# Patient Record
Sex: FEMALE | Race: WHITE | NOT HISPANIC OR LATINO | Employment: OTHER
[De-identification: names, ages, dates, MRNs, and addresses within clinical notes are randomized per-mention and may not be internally consistent; named-entity substitution may affect disease eponyms.]

---

## 2019-01-23 ENCOUNTER — HOSPITAL (OUTPATIENT)
Dept: OTHER | Age: 59
End: 2019-01-23

## 2019-02-06 ENCOUNTER — HOSPITAL (OUTPATIENT)
Dept: OTHER | Age: 59
End: 2019-02-06

## 2020-01-06 ENCOUNTER — TELEPHONE (OUTPATIENT)
Dept: FAMILY MEDICINE CLINIC | Facility: CLINIC | Age: 60
End: 2020-01-06

## 2020-01-06 NOTE — TELEPHONE ENCOUNTER
Surgery on 01/27/20, LTHA with Dr. Rojelio Cloud @ [facility]    H&P- complete  Labs- MCV (101.5), RDW-SD (47.0), BUN/creat ratio (25.4), AST (12), HGA1C (6.0), PT (28.8), INR (2.10), all other labs WNL  EKG- abnormal  X-ray- abnormal    Dr Jasso Cons

## 2020-01-07 ENCOUNTER — APPOINTMENT (OUTPATIENT)
Dept: LAB | Facility: HOSPITAL | Age: 60
End: 2020-01-07
Attending: FAMILY MEDICINE
Payer: MEDICARE

## 2020-01-07 ENCOUNTER — OFFICE VISIT (OUTPATIENT)
Dept: FAMILY MEDICINE CLINIC | Facility: CLINIC | Age: 60
End: 2020-01-07
Payer: MEDICARE

## 2020-01-07 ENCOUNTER — OFFICE VISIT (OUTPATIENT)
Dept: OTHER | Facility: HOSPITAL | Age: 60
End: 2020-01-07
Attending: EMERGENCY MEDICINE

## 2020-01-07 ENCOUNTER — HOSPITAL ENCOUNTER (OUTPATIENT)
Dept: GENERAL RADIOLOGY | Facility: HOSPITAL | Age: 60
Discharge: HOME | End: 2020-01-07
Attending: FAMILY MEDICINE
Payer: MEDICARE

## 2020-01-07 VITALS
WEIGHT: 293 LBS | RESPIRATION RATE: 16 BRPM | HEIGHT: 70 IN | HEART RATE: 68 BPM | BODY MASS INDEX: 41.95 KG/M2 | OXYGEN SATURATION: 97 % | SYSTOLIC BLOOD PRESSURE: 110 MMHG | DIASTOLIC BLOOD PRESSURE: 60 MMHG | TEMPERATURE: 98 F

## 2020-01-07 DIAGNOSIS — M15.9 PRIMARY OSTEOARTHRITIS INVOLVING MULTIPLE JOINTS: ICD-10-CM

## 2020-01-07 DIAGNOSIS — Z01.812 PRE-OPERATIVE LABORATORY EXAMINATION: ICD-10-CM

## 2020-01-07 DIAGNOSIS — I47.1 PAROXYSMAL SVT (SUPRAVENTRICULAR TACHYCARDIA) (HCC): ICD-10-CM

## 2020-01-07 DIAGNOSIS — I80.9 BLOOD CLOT ASSOCIATED WITH VEIN WALL INFLAMMATION: ICD-10-CM

## 2020-01-07 DIAGNOSIS — J44.9 CHRONIC OBSTRUCTIVE PULMONARY DISEASE, UNSPECIFIED COPD TYPE (HCC): ICD-10-CM

## 2020-01-07 DIAGNOSIS — Z77.21 EXPOSURE TO BLOOD OR BODY FLUID: Primary | ICD-10-CM

## 2020-01-07 DIAGNOSIS — J30.9 ALLERGIC SINUSITIS: ICD-10-CM

## 2020-01-07 DIAGNOSIS — Z01.818 OTHER SPECIFIED PRE-OPERATIVE EXAMINATION: ICD-10-CM

## 2020-01-07 DIAGNOSIS — G47.33 OSA ON CPAP: ICD-10-CM

## 2020-01-07 DIAGNOSIS — Z92.29 HISTORY OF COUMADIN THERAPY: ICD-10-CM

## 2020-01-07 DIAGNOSIS — Z86.711 HISTORY OF PULMONARY EMBOLUS (PE): ICD-10-CM

## 2020-01-07 DIAGNOSIS — M47.816 LUMBAR SPONDYLOSIS: ICD-10-CM

## 2020-01-07 DIAGNOSIS — M35.3 PMR (POLYMYALGIA RHEUMATICA) (HCC): ICD-10-CM

## 2020-01-07 DIAGNOSIS — I10 ESSENTIAL HYPERTENSION: ICD-10-CM

## 2020-01-07 DIAGNOSIS — D89.2 HYPERGAMMAGLOBULINEMIA: ICD-10-CM

## 2020-01-07 DIAGNOSIS — R73.01 ELEVATED FASTING BLOOD SUGAR: ICD-10-CM

## 2020-01-07 DIAGNOSIS — I87.2 VENOUS INSUFFICIENCY OF BOTH LOWER EXTREMITIES: ICD-10-CM

## 2020-01-07 DIAGNOSIS — F51.01 PRIMARY INSOMNIA: ICD-10-CM

## 2020-01-07 DIAGNOSIS — E78.2 MIXED HYPERLIPIDEMIA: ICD-10-CM

## 2020-01-07 DIAGNOSIS — M16.12 PRIMARY OSTEOARTHRITIS OF LEFT HIP: Primary | ICD-10-CM

## 2020-01-07 DIAGNOSIS — Z99.89 OSA ON CPAP: ICD-10-CM

## 2020-01-07 LAB
ALBUMIN SERPL-MCNC: 3.8 G/DL (ref 3.4–5)
ALBUMIN/GLOB SERPL: 1.1 {RATIO} (ref 1–2)
ALP LIVER SERPL-CCNC: 73 U/L (ref 46–118)
ALT SERPL-CCNC: 16 U/L (ref 13–56)
ANION GAP SERPL CALC-SCNC: 3 MMOL/L (ref 0–18)
ANTIBODY SCREEN: NEGATIVE
APTT PPP: 35 SECONDS (ref 23.2–35.3)
AST SERPL-CCNC: 12 U/L (ref 15–37)
BASOPHILS # BLD AUTO: 0.04 X10(3) UL (ref 0–0.2)
BASOPHILS NFR BLD AUTO: 0.5 %
BILIRUB SERPL-MCNC: 0.3 MG/DL (ref 0.1–2)
BUN BLD-MCNC: 18 MG/DL (ref 7–18)
BUN/CREAT SERPL: 25.4 (ref 10–20)
CALCIUM BLD-MCNC: 9.4 MG/DL (ref 8.5–10.1)
CHLORIDE SERPL-SCNC: 106 MMOL/L (ref 98–112)
CO2 SERPL-SCNC: 32 MMOL/L (ref 21–32)
CREAT BLD-MCNC: 0.71 MG/DL (ref 0.55–1.02)
DEPRECATED RDW RBC AUTO: 47 FL (ref 35.1–46.3)
EOSINOPHIL # BLD AUTO: 0.2 X10(3) UL (ref 0–0.7)
EOSINOPHIL NFR BLD AUTO: 2.3 %
ERYTHROCYTE [DISTWIDTH] IN BLOOD BY AUTOMATED COUNT: 12.5 % (ref 11–15)
EST. AVERAGE GLUCOSE BLD GHB EST-MCNC: 126 MG/DL (ref 68–126)
GLOBULIN PLAS-MCNC: 3.5 G/DL (ref 2.8–4.4)
GLUCOSE BLD-MCNC: 91 MG/DL (ref 70–99)
HBA1C MFR BLD HPLC: 6 % (ref ?–5.7)
HBV SURFACE AG SER-ACNC: <0.1 [IU]/L
HBV SURFACE AG SERPL QL IA: NONREACTIVE
HCT VFR BLD AUTO: 40 % (ref 35–48)
HCV AB SERPL QL IA: NONREACTIVE
HGB BLD-MCNC: 12.8 G/DL (ref 12–16)
HIV1+2 AB SPEC QL IA.RAPID: NONREACTIVE
IMM GRANULOCYTES # BLD AUTO: 0.02 X10(3) UL (ref 0–1)
IMM GRANULOCYTES NFR BLD: 0.2 %
INR BLD: 2.1 (ref 0.9–1.2)
LYMPHOCYTES # BLD AUTO: 3.17 X10(3) UL (ref 1–4)
LYMPHOCYTES NFR BLD AUTO: 36.9 %
M PROTEIN MFR SERPL ELPH: 7.3 G/DL (ref 6.4–8.2)
MCH RBC QN AUTO: 32.5 PG (ref 26–34)
MCHC RBC AUTO-ENTMCNC: 32 G/DL (ref 31–37)
MCV RBC AUTO: 101.5 FL (ref 80–100)
MONOCYTES # BLD AUTO: 0.48 X10(3) UL (ref 0.1–1)
MONOCYTES NFR BLD AUTO: 5.6 %
NEUTROPHILS # BLD AUTO: 4.67 X10 (3) UL (ref 1.5–7.7)
NEUTROPHILS # BLD AUTO: 4.67 X10(3) UL (ref 1.5–7.7)
NEUTROPHILS NFR BLD AUTO: 54.5 %
OSMOLALITY SERPL CALC.SUM OF ELEC: 293 MOSM/KG (ref 275–295)
PATIENT FASTING Y/N/NP: YES
PLATELET # BLD AUTO: 354 10(3)UL (ref 150–450)
POTASSIUM SERPL-SCNC: 4.2 MMOL/L (ref 3.5–5.1)
PROTHROMBIN TIME: 23.8 SECONDS (ref 11.8–14.5)
RBC # BLD AUTO: 3.94 X10(6)UL (ref 3.8–5.3)
RH BLOOD TYPE: POSITIVE
SODIUM SERPL-SCNC: 141 MMOL/L (ref 136–145)
WBC # BLD AUTO: 8.6 X10(3) UL (ref 4–11)

## 2020-01-07 PROCEDURE — 87081 CULTURE SCREEN ONLY: CPT

## 2020-01-07 PROCEDURE — 36415 COLL VENOUS BLD VENIPUNCTURE: CPT

## 2020-01-07 PROCEDURE — 93010 ELECTROCARDIOGRAM REPORT: CPT | Performed by: FAMILY MEDICINE

## 2020-01-07 PROCEDURE — 80053 COMPREHEN METABOLIC PANEL: CPT

## 2020-01-07 PROCEDURE — 83036 HEMOGLOBIN GLYCOSYLATED A1C: CPT

## 2020-01-07 PROCEDURE — 71046 X-RAY EXAM CHEST 2 VIEWS: CPT | Performed by: FAMILY MEDICINE

## 2020-01-07 PROCEDURE — 86803 HEPATITIS C AB TEST: CPT

## 2020-01-07 PROCEDURE — 87340 HEPATITIS B SURFACE AG IA: CPT

## 2020-01-07 PROCEDURE — 85025 COMPLETE CBC W/AUTO DIFF WBC: CPT

## 2020-01-07 PROCEDURE — 86900 BLOOD TYPING SEROLOGIC ABO: CPT

## 2020-01-07 PROCEDURE — 86701 HIV-1ANTIBODY: CPT

## 2020-01-07 PROCEDURE — 85610 PROTHROMBIN TIME: CPT

## 2020-01-07 PROCEDURE — 99204 OFFICE O/P NEW MOD 45 MIN: CPT | Performed by: FAMILY MEDICINE

## 2020-01-07 PROCEDURE — 85730 THROMBOPLASTIN TIME PARTIAL: CPT

## 2020-01-07 PROCEDURE — 86850 RBC ANTIBODY SCREEN: CPT

## 2020-01-07 PROCEDURE — 86901 BLOOD TYPING SEROLOGIC RH(D): CPT

## 2020-01-07 PROCEDURE — 93005 ELECTROCARDIOGRAM TRACING: CPT

## 2020-01-07 RX ORDER — WARFARIN SODIUM 7.5 MG/1
7.5 TABLET ORAL NIGHTLY
COMMUNITY

## 2020-01-08 NOTE — H&P (VIEW-ONLY)
[facility] PRE-OP CLINIC [place]    PRE-OP NOTE    HPI:   I have been consulted by Dr. Elmira De La Rosa [facility] 61[facility] for a preoperative evaluation and medical clearance.  She has a long history of worsening severe degenerative arthritis improvement in cramps increase if needed up to 2 tid 90 tablet 3   • triamcinolone acetonide (KENALOG) 0.1 % Apply Externally Ointment APPLY TO THE AFFECTED AREA ON LOWER LEGS TWICE DAILY FOR ONE- TWO WEEKS THEN TAPER AS NEEDED (Patient not taking: Reporte Sexual activity: Not on file    Lifestyle      Physical activity:        Days per week: Not on file        Minutes per session: Not on file      Stress: Not on file    Relationships      Social connections:        Talks on phone: Not on file        Gets to index is 42.62 kg/m² as calculated from the following:    Height as of this encounter: 70\". Weight as of this encounter: 297 lb (134.7 kg). Vital signs reviewed. Appears stated age, well groomed.   Physical Exam:  GEN:  Patient is alert, awake and orie Date: 1/7/2020  ECG Report  Interpretation  -------------------------- Sinus Bradycardia -With rate variation cv = 11. Low voltage in precordial leads.  ABNORMAL No previous ECGs available Electronically signed on 01/07/2020 at 12:19 by Jay Hale (CPT=[id number]), URINE MICROSCOPIC W         REFLEX CULTURE, TYPE AND SCREEN    Patient Active Problem List:     Cramps, extremity     CXR, ECG and labs are pending review    Preoperative Risk Stratification: She can preform >4 METS without ischemic symptoms.

## 2020-01-08 NOTE — H&P
[facility] PRE-OP CLINIC [place]    PRE-OP NOTE    HPI:   I have been consulted by Dr. Ramon Terrell to see Marya Puff Holstein 61year old female for a preoperative evaluation and medical clearance.  She has a long history of worsening severe degenerative arthritis improvement in cramps increase if needed up to 2 tid 90 tablet 3   • triamcinolone acetonide (KENALOG) 0.1 % Apply Externally Ointment APPLY TO THE AFFECTED AREA ON LOWER LEGS TWICE DAILY FOR ONE- TWO WEEKS THEN TAPER AS NEEDED (Patient not taking: Reporte Sexual activity: Not on file    Lifestyle      Physical activity:        Days per week: Not on file        Minutes per session: Not on file      Stress: Not on file    Relationships      Social connections:        Talks on phone: Not on file        Gets to index is 42.62 kg/m² as calculated from the following:    Height as of this encounter: 70\". Weight as of this encounter: 297 lb (134.7 kg). Vital signs reviewed. Appears stated age, well groomed.   Physical Exam:  GEN:  Patient is alert, awake and orie Date: 1/7/2020  ECG Report  Interpretation  -------------------------- Sinus Bradycardia -With rate variation cv = 11. Low voltage in precordial leads.  ABNORMAL No previous ECGs available Electronically signed on 01/07/2020 at 12:19 by Zuri Love (CPT=[id number]), URINE MICROSCOPIC W         REFLEX CULTURE, TYPE AND SCREEN    Patient Active Problem List:     Cramps, extremity     CXR, ECG and labs are pending review    Preoperative Risk Stratification: She can preform >4 METS without ischemic symptoms.

## 2020-01-18 NOTE — TELEPHONE ENCOUNTER
Spoke to patient she will get UA done at [facility] 01/21/20. We will scan in results when available.      IVC filtered placed on 01/15    Dr Edin Smith Endocrinology - Clearance Letter scanned in Media 01/07/20    Pulmonary and Kingsley Carvalho

## 2020-01-21 PROBLEM — M16.12 OSTEOARTHRITIS OF LEFT HIP: Status: ACTIVE | Noted: 2020-01-21

## 2020-01-21 PROBLEM — M47.816 LUMBAR SPONDYLOSIS: Status: ACTIVE | Noted: 2020-01-21

## 2020-01-21 PROBLEM — E78.2 MIXED HYPERLIPIDEMIA: Chronic | Status: ACTIVE | Noted: 2020-01-21

## 2020-01-21 PROBLEM — Z86.711 HISTORY OF PULMONARY EMBOLISM: Chronic | Status: ACTIVE | Noted: 2020-01-21

## 2020-01-22 RX ORDER — DULOXETIN HYDROCHLORIDE 60 MG/1
60 CAPSULE, DELAYED RELEASE ORAL DAILY
COMMUNITY

## 2020-01-22 RX ORDER — TRAZODONE HYDROCHLORIDE 50 MG/1
100 TABLET ORAL NIGHTLY
COMMUNITY

## 2020-01-22 RX ORDER — ZOLPIDEM TARTRATE 10 MG/1
10 TABLET ORAL NIGHTLY PRN
COMMUNITY

## 2020-01-22 RX ORDER — CYCLOBENZAPRINE HCL 5 MG
5 TABLET ORAL 3 TIMES DAILY PRN
COMMUNITY

## 2020-01-22 RX ORDER — MONTELUKAST SODIUM 10 MG/1
10 TABLET ORAL EVERY MORNING
COMMUNITY

## 2020-01-22 RX ORDER — BIOTIN 1 MG
1000 TABLET ORAL DAILY
COMMUNITY

## 2020-01-22 RX ORDER — METOPROLOL TARTRATE 50 MG/1
75 TABLET, FILM COATED ORAL 2 TIMES DAILY
COMMUNITY

## 2020-01-22 RX ORDER — PRAVASTATIN SODIUM 80 MG/1
80 TABLET ORAL EVERY MORNING
COMMUNITY

## 2020-01-22 RX ORDER — OMEPRAZOLE 40 MG/1
40 CAPSULE, DELAYED RELEASE ORAL DAILY PRN
COMMUNITY

## 2020-01-23 NOTE — TELEPHONE ENCOUNTER
Dr Hima Josue please review.     H&P- complete  Labs- MCV (101.5), RDW-SD (47.0), BUN/creat ratio (25.4), AST (12), HGA1C (6.0), PT (28.8), INR (2.10), all other labs WNL  EKG- abnormal  X-ray- abnormal    IVC filtered placed on 01/15    Dr Pennie Solis Endocrinology -

## 2020-01-24 NOTE — CM/SW NOTE
SW received a call from the pt prior to surgery. Per pt, she would like to discuss ROHAN. Pt is scheduled for surgery with Dr. Shila Paulson on Monday 1/27/2020.  Pt states she would like to consider SNF and if needed she would like referrals to be sent to [facility]

## 2020-01-24 NOTE — CM/SW NOTE
SULEMA placed call to Dr. Carlos Lomeli nurse, Renuka Wright [phone] today 1/24 at 11am to alert of the pt's intent for snf placement after her surgery on Monday.  SULEMA relayed the pt's request for a 3-day waiver, however hip and knees are not a bundle that [address]

## 2020-01-27 ENCOUNTER — ANESTHESIA EVENT (OUTPATIENT)
Dept: SURGERY | Facility: HOSPITAL | Age: 60
DRG: 470 | End: 2020-01-27
Payer: MEDICARE

## 2020-01-27 ENCOUNTER — ANESTHESIA (OUTPATIENT)
Dept: SURGERY | Facility: HOSPITAL | Age: 60
DRG: 470 | End: 2020-01-27
Payer: MEDICARE

## 2020-01-27 ENCOUNTER — APPOINTMENT (OUTPATIENT)
Dept: GENERAL RADIOLOGY | Facility: HOSPITAL | Age: 60
DRG: 470 | End: 2020-01-27
Attending: PHYSICIAN ASSISTANT
Payer: MEDICARE

## 2020-01-27 ENCOUNTER — HOSPITAL ENCOUNTER (INPATIENT)
Facility: HOSPITAL | Age: 60
LOS: 3 days | Discharge: NURSING, REHAB OR LONG-TERM CARE | DRG: 470 | End: 2020-01-30
Attending: ORTHOPAEDIC SURGERY | Admitting: ORTHOPAEDIC SURGERY
Payer: MEDICARE

## 2020-01-27 PROBLEM — Z96.649 HISTORY OF HIP REPLACEMENT: Status: ACTIVE | Noted: 2020-01-27

## 2020-01-27 LAB
ALBUMIN SERPL-MCNC: 3.6 G/DL (ref 3.4–5)
ALBUMIN/GLOB SERPL: 1 {RATIO} (ref 1–2)
ALP LIVER SERPL-CCNC: 67 U/L (ref 46–118)
ALT SERPL-CCNC: 16 U/L (ref 13–56)
ANION GAP SERPL CALC-SCNC: 4 MMOL/L (ref 0–18)
AST SERPL-CCNC: 22 U/L (ref 15–37)
BILIRUB SERPL-MCNC: 0.4 MG/DL (ref 0.1–2)
BUN BLD-MCNC: 16 MG/DL (ref 7–18)
BUN/CREAT SERPL: 21.9 (ref 10–20)
CALCIUM BLD-MCNC: 9.2 MG/DL (ref 8.5–10.1)
CHLORIDE SERPL-SCNC: 106 MMOL/L (ref 98–112)
CO2 SERPL-SCNC: 29 MMOL/L (ref 21–32)
CREAT BLD-MCNC: 0.73 MG/DL (ref 0.55–1.02)
GLOBULIN PLAS-MCNC: 3.5 G/DL (ref 2.8–4.4)
GLUCOSE BLD-MCNC: 119 MG/DL (ref 70–99)
GLUCOSE BLDC GLUCOMTR-MCNC: 107 MG/DL (ref 70–99)
GLUCOSE BLDC GLUCOMTR-MCNC: 111 MG/DL (ref 70–99)
GLUCOSE BLDC GLUCOMTR-MCNC: 130 MG/DL (ref 70–99)
GLUCOSE BLDC GLUCOMTR-MCNC: 141 MG/DL (ref 70–99)
GLUCOSE BLDC GLUCOMTR-MCNC: 159 MG/DL (ref 70–99)
HCT VFR BLD AUTO: 38.1 % (ref 35–48)
HGB BLD-MCNC: 12.5 G/DL (ref 12–16)
INR BLD: 1 (ref 0.9–1.2)
M PROTEIN MFR SERPL ELPH: 7.1 G/DL (ref 6.4–8.2)
OSMOLALITY SERPL CALC.SUM OF ELEC: 290 MOSM/KG (ref 275–295)
POTASSIUM SERPL-SCNC: 4.5 MMOL/L (ref 3.5–5.1)
PROTHROMBIN TIME: 13 SECONDS (ref 11.8–14.5)
SODIUM SERPL-SCNC: 139 MMOL/L (ref 136–145)

## 2020-01-27 PROCEDURE — 82962 GLUCOSE BLOOD TEST: CPT

## 2020-01-27 PROCEDURE — 97162 PT EVAL MOD COMPLEX 30 MIN: CPT

## 2020-01-27 PROCEDURE — 36415 COLL VENOUS BLD VENIPUNCTURE: CPT | Performed by: ORTHOPAEDIC SURGERY

## 2020-01-27 PROCEDURE — 0SRB04A REPLACEMENT OF LEFT HIP JOINT WITH CERAMIC ON POLYETHYLENE SYNTHETIC SUBSTITUTE, UNCEMENTED, OPEN APPROACH: ICD-10-PCS | Performed by: ORTHOPAEDIC SURGERY

## 2020-01-27 PROCEDURE — 88305 TISSUE EXAM BY PATHOLOGIST: CPT | Performed by: ORTHOPAEDIC SURGERY

## 2020-01-27 PROCEDURE — 73502 X-RAY EXAM HIP UNI 2-3 VIEWS: CPT | Performed by: PHYSICIAN ASSISTANT

## 2020-01-27 PROCEDURE — 88311 DECALCIFY TISSUE: CPT | Performed by: ORTHOPAEDIC SURGERY

## 2020-01-27 PROCEDURE — 80053 COMPREHEN METABOLIC PANEL: CPT | Performed by: PHYSICIAN ASSISTANT

## 2020-01-27 PROCEDURE — 97530 THERAPEUTIC ACTIVITIES: CPT

## 2020-01-27 PROCEDURE — 85018 HEMOGLOBIN: CPT | Performed by: PHYSICIAN ASSISTANT

## 2020-01-27 PROCEDURE — 85610 PROTHROMBIN TIME: CPT | Performed by: ORTHOPAEDIC SURGERY

## 2020-01-27 PROCEDURE — 85014 HEMATOCRIT: CPT | Performed by: PHYSICIAN ASSISTANT

## 2020-01-27 DEVICE — SHELL ACET HEMI 54MM STRL HIP: Type: IMPLANTABLE DEVICE | Site: HIP | Status: FUNCTIONAL

## 2020-01-27 DEVICE — SCREW BN 6.5MM 25MM CANC TI: Type: IMPLANTABLE DEVICE | Site: HIP | Status: FUNCTIONAL

## 2020-01-27 DEVICE — LINER ACET MP8 NEUT 40MM NLTX: Type: IMPLANTABLE DEVICE | Site: HIP | Status: FUNCTIONAL

## 2020-01-27 DEVICE — STEM FEM LAT OFF STRL HIP P2: Type: IMPLANTABLE DEVICE | Site: HIP | Status: FUNCTIONAL

## 2020-01-27 DEVICE — HEAD FEM NEUT 40MM HIP BIO: Type: IMPLANTABLE DEVICE | Site: HIP | Status: FUNCTIONAL

## 2020-01-27 DEVICE — SCREW BN 6.5MM 20MM CANC TI: Type: IMPLANTABLE DEVICE | Site: HIP | Status: FUNCTIONAL

## 2020-01-27 DEVICE — THA CAP, PRIMARY: Type: IMPLANTABLE DEVICE | Status: FUNCTIONAL

## 2020-01-27 RX ORDER — TRANEXAMIC ACID 10 MG/ML
1000 INJECTION, SOLUTION INTRAVENOUS ONCE
Status: COMPLETED | OUTPATIENT
Start: 2020-01-27 | End: 2020-01-27

## 2020-01-27 RX ORDER — CEFADROXIL 500 MG/1
500 CAPSULE ORAL 2 TIMES DAILY
Qty: 14 CAPSULE | Refills: 0 | Status: SHIPPED | OUTPATIENT
Start: 2020-01-27 | End: 2020-02-03

## 2020-01-27 RX ORDER — ONDANSETRON 4 MG/1
4 TABLET, FILM COATED ORAL EVERY 8 HOURS PRN
Qty: 30 TABLET | Refills: 0 | Status: SHIPPED | OUTPATIENT
Start: 2020-01-27 | End: 2020-06-19

## 2020-01-27 RX ORDER — HYDROMORPHONE HYDROCHLORIDE 1 MG/ML
0.4 INJECTION, SOLUTION INTRAMUSCULAR; INTRAVENOUS; SUBCUTANEOUS EVERY 2 HOUR PRN
Status: DISCONTINUED | OUTPATIENT
Start: 2020-01-27 | End: 2020-01-30

## 2020-01-27 RX ORDER — BISACODYL 10 MG
10 SUPPOSITORY, RECTAL RECTAL
Status: DISCONTINUED | OUTPATIENT
Start: 2020-01-27 | End: 2020-01-30

## 2020-01-27 RX ORDER — CYCLOBENZAPRINE HCL 5 MG
5 TABLET ORAL EVERY 8 HOURS PRN
Status: DISCONTINUED | OUTPATIENT
Start: 2020-01-27 | End: 2020-01-30

## 2020-01-27 RX ORDER — OXYCODONE HCL 10 MG/1
10 TABLET, FILM COATED, EXTENDED RELEASE ORAL ONCE
Status: COMPLETED | OUTPATIENT
Start: 2020-01-27 | End: 2020-01-27

## 2020-01-27 RX ORDER — MORPHINE SULFATE 4 MG/ML
4 INJECTION, SOLUTION INTRAMUSCULAR; INTRAVENOUS EVERY 10 MIN PRN
Status: DISCONTINUED | OUTPATIENT
Start: 2020-01-27 | End: 2020-01-27 | Stop reason: HOSPADM

## 2020-01-27 RX ORDER — IBUPROFEN 600 MG/1
600 TABLET ORAL EVERY 8 HOURS PRN
Qty: 90 TABLET | Refills: 2 | Status: SHIPPED | OUTPATIENT
Start: 2020-01-27 | End: 2020-06-19

## 2020-01-27 RX ORDER — ACETAMINOPHEN 500 MG
1000 TABLET ORAL ONCE
Status: COMPLETED | OUTPATIENT
Start: 2020-01-27 | End: 2020-01-27

## 2020-01-27 RX ORDER — WARFARIN SODIUM 7.5 MG/1
7.5 TABLET ORAL NIGHTLY
Status: DISCONTINUED | OUTPATIENT
Start: 2020-01-27 | End: 2020-01-28

## 2020-01-27 RX ORDER — DEXAMETHASONE SODIUM PHOSPHATE 4 MG/ML
VIAL (ML) INJECTION AS NEEDED
Status: DISCONTINUED | OUTPATIENT
Start: 2020-01-27 | End: 2020-01-27 | Stop reason: SURG

## 2020-01-27 RX ORDER — TRAZODONE HYDROCHLORIDE 50 MG/1
50 TABLET ORAL NIGHTLY
Status: DISCONTINUED | OUTPATIENT
Start: 2020-01-27 | End: 2020-01-30

## 2020-01-27 RX ORDER — SODIUM PHOSPHATE, DIBASIC AND SODIUM PHOSPHATE, MONOBASIC 7; 19 G/133ML; G/133ML
1 ENEMA RECTAL ONCE AS NEEDED
Status: DISCONTINUED | OUTPATIENT
Start: 2020-01-27 | End: 2020-01-30

## 2020-01-27 RX ORDER — SODIUM CHLORIDE 0.9 % (FLUSH) 0.9 %
10 SYRINGE (ML) INJECTION AS NEEDED
Status: DISCONTINUED | OUTPATIENT
Start: 2020-01-27 | End: 2020-01-30

## 2020-01-27 RX ORDER — SODIUM CHLORIDE, SODIUM LACTATE, POTASSIUM CHLORIDE, CALCIUM CHLORIDE 600; 310; 30; 20 MG/100ML; MG/100ML; MG/100ML; MG/100ML
INJECTION, SOLUTION INTRAVENOUS CONTINUOUS
Status: DISCONTINUED | OUTPATIENT
Start: 2020-01-27 | End: 2020-01-30

## 2020-01-27 RX ORDER — DIPHENHYDRAMINE HYDROCHLORIDE 50 MG/ML
25 INJECTION INTRAMUSCULAR; INTRAVENOUS ONCE AS NEEDED
Status: ACTIVE | OUTPATIENT
Start: 2020-01-27 | End: 2020-01-27

## 2020-01-27 RX ORDER — PRAVASTATIN SODIUM 20 MG
80 TABLET ORAL DAILY
Status: DISCONTINUED | OUTPATIENT
Start: 2020-01-28 | End: 2020-01-30

## 2020-01-27 RX ORDER — DOCUSATE SODIUM 100 MG/1
100 CAPSULE, LIQUID FILLED ORAL 2 TIMES DAILY
Status: DISCONTINUED | OUTPATIENT
Start: 2020-01-27 | End: 2020-01-30

## 2020-01-27 RX ORDER — CELECOXIB 200 MG/1
200 CAPSULE ORAL ONCE
Status: COMPLETED | OUTPATIENT
Start: 2020-01-27 | End: 2020-01-27

## 2020-01-27 RX ORDER — OXYCODONE AND ACETAMINOPHEN 10; 325 MG/1; MG/1
1-2 TABLET ORAL EVERY 6 HOURS PRN
Status: DISCONTINUED | OUTPATIENT
Start: 2020-01-27 | End: 2020-01-30

## 2020-01-27 RX ORDER — ONDANSETRON 2 MG/ML
4 INJECTION INTRAMUSCULAR; INTRAVENOUS ONCE AS NEEDED
Status: DISCONTINUED | OUTPATIENT
Start: 2020-01-27 | End: 2020-01-27 | Stop reason: HOSPADM

## 2020-01-27 RX ORDER — FAMOTIDINE 20 MG/1
20 TABLET ORAL 2 TIMES DAILY
Status: DISCONTINUED | OUTPATIENT
Start: 2020-01-27 | End: 2020-01-30

## 2020-01-27 RX ORDER — DEXTROSE MONOHYDRATE 25 G/50ML
50 INJECTION, SOLUTION INTRAVENOUS
Status: DISCONTINUED | OUTPATIENT
Start: 2020-01-27 | End: 2020-01-27 | Stop reason: HOSPADM

## 2020-01-27 RX ORDER — HYDROMORPHONE HYDROCHLORIDE 1 MG/ML
0.8 INJECTION, SOLUTION INTRAMUSCULAR; INTRAVENOUS; SUBCUTANEOUS EVERY 2 HOUR PRN
Status: DISCONTINUED | OUTPATIENT
Start: 2020-01-27 | End: 2020-01-30

## 2020-01-27 RX ORDER — HYDROMORPHONE HYDROCHLORIDE 1 MG/ML
0.4 INJECTION, SOLUTION INTRAMUSCULAR; INTRAVENOUS; SUBCUTANEOUS EVERY 5 MIN PRN
Status: DISCONTINUED | OUTPATIENT
Start: 2020-01-27 | End: 2020-01-27 | Stop reason: HOSPADM

## 2020-01-27 RX ORDER — NALOXONE HYDROCHLORIDE 0.4 MG/ML
80 INJECTION, SOLUTION INTRAMUSCULAR; INTRAVENOUS; SUBCUTANEOUS AS NEEDED
Status: DISCONTINUED | OUTPATIENT
Start: 2020-01-27 | End: 2020-01-27 | Stop reason: HOSPADM

## 2020-01-27 RX ORDER — BUPIVACAINE HYDROCHLORIDE 7.5 MG/ML
INJECTION, SOLUTION INTRASPINAL AS NEEDED
Status: DISCONTINUED | OUTPATIENT
Start: 2020-01-27 | End: 2020-01-27 | Stop reason: SURG

## 2020-01-27 RX ORDER — GLYCOPYRROLATE 0.2 MG/ML
INJECTION INTRAMUSCULAR; INTRAVENOUS AS NEEDED
Status: DISCONTINUED | OUTPATIENT
Start: 2020-01-27 | End: 2020-01-27 | Stop reason: SURG

## 2020-01-27 RX ORDER — TRANEXAMIC ACID 10 MG/ML
INJECTION, SOLUTION INTRAVENOUS AS NEEDED
Status: DISCONTINUED | OUTPATIENT
Start: 2020-01-27 | End: 2020-01-27 | Stop reason: SURG

## 2020-01-27 RX ORDER — HYDROMORPHONE HYDROCHLORIDE 1 MG/ML
0.2 INJECTION, SOLUTION INTRAMUSCULAR; INTRAVENOUS; SUBCUTANEOUS EVERY 5 MIN PRN
Status: DISCONTINUED | OUTPATIENT
Start: 2020-01-27 | End: 2020-01-27 | Stop reason: HOSPADM

## 2020-01-27 RX ORDER — NEOSTIGMINE METHYLSULFATE 0.5 MG/ML
INJECTION INTRAVENOUS AS NEEDED
Status: DISCONTINUED | OUTPATIENT
Start: 2020-01-27 | End: 2020-01-27 | Stop reason: SURG

## 2020-01-27 RX ORDER — OXYCODONE HCL 10 MG/1
10 TABLET, FILM COATED, EXTENDED RELEASE ORAL EVERY 12 HOURS
Qty: 30 TABLET | Refills: 0 | Status: SHIPPED | OUTPATIENT
Start: 2020-01-27 | End: 2020-06-19

## 2020-01-27 RX ORDER — MONTELUKAST SODIUM 10 MG/1
10 TABLET ORAL EVERY MORNING
Status: DISCONTINUED | OUTPATIENT
Start: 2020-01-28 | End: 2020-01-30

## 2020-01-27 RX ORDER — WARFARIN SODIUM 5 MG/1
5 TABLET ORAL ONCE
Status: DISCONTINUED | OUTPATIENT
Start: 2020-01-27 | End: 2020-01-27

## 2020-01-27 RX ORDER — MIDAZOLAM HYDROCHLORIDE 1 MG/ML
INJECTION INTRAMUSCULAR; INTRAVENOUS AS NEEDED
Status: DISCONTINUED | OUTPATIENT
Start: 2020-01-27 | End: 2020-01-27 | Stop reason: SURG

## 2020-01-27 RX ORDER — PROCHLORPERAZINE EDISYLATE 5 MG/ML
10 INJECTION INTRAMUSCULAR; INTRAVENOUS EVERY 6 HOURS PRN
Status: ACTIVE | OUTPATIENT
Start: 2020-01-27 | End: 2020-01-29

## 2020-01-27 RX ORDER — DIPHENHYDRAMINE HCL 25 MG
25 CAPSULE ORAL EVERY 4 HOURS PRN
Status: DISCONTINUED | OUTPATIENT
Start: 2020-01-27 | End: 2020-01-30

## 2020-01-27 RX ORDER — METOCLOPRAMIDE HYDROCHLORIDE 5 MG/ML
10 INJECTION INTRAMUSCULAR; INTRAVENOUS EVERY 6 HOURS PRN
Status: ACTIVE | OUTPATIENT
Start: 2020-01-27 | End: 2020-01-29

## 2020-01-27 RX ORDER — HYDROMORPHONE HYDROCHLORIDE 1 MG/ML
0.6 INJECTION, SOLUTION INTRAMUSCULAR; INTRAVENOUS; SUBCUTANEOUS EVERY 5 MIN PRN
Status: DISCONTINUED | OUTPATIENT
Start: 2020-01-27 | End: 2020-01-27 | Stop reason: HOSPADM

## 2020-01-27 RX ORDER — MORPHINE SULFATE 4 MG/ML
2 INJECTION, SOLUTION INTRAMUSCULAR; INTRAVENOUS EVERY 10 MIN PRN
Status: DISCONTINUED | OUTPATIENT
Start: 2020-01-27 | End: 2020-01-27 | Stop reason: HOSPADM

## 2020-01-27 RX ORDER — DOCUSATE SODIUM 100 MG/1
100 CAPSULE, LIQUID FILLED ORAL 2 TIMES DAILY
Qty: 60 CAPSULE | Refills: 2 | Status: SHIPPED | OUTPATIENT
Start: 2020-01-27 | End: 2020-06-19

## 2020-01-27 RX ORDER — ONDANSETRON 2 MG/ML
INJECTION INTRAMUSCULAR; INTRAVENOUS AS NEEDED
Status: DISCONTINUED | OUTPATIENT
Start: 2020-01-27 | End: 2020-01-27 | Stop reason: SURG

## 2020-01-27 RX ORDER — FAMOTIDINE 10 MG/ML
20 INJECTION, SOLUTION INTRAVENOUS 2 TIMES DAILY
Status: DISCONTINUED | OUTPATIENT
Start: 2020-01-27 | End: 2020-01-30

## 2020-01-27 RX ORDER — DIPHENHYDRAMINE HCL 25 MG
75 CAPSULE ORAL 2 TIMES DAILY PRN
Status: DISCONTINUED | OUTPATIENT
Start: 2020-01-27 | End: 2020-01-30

## 2020-01-27 RX ORDER — SCOLOPAMINE TRANSDERMAL SYSTEM 1 MG/1
1 PATCH, EXTENDED RELEASE TRANSDERMAL ONCE
Status: COMPLETED | OUTPATIENT
Start: 2020-01-27 | End: 2020-01-30

## 2020-01-27 RX ORDER — HYDROCODONE BITARTRATE AND ACETAMINOPHEN 5; 325 MG/1; MG/1
1 TABLET ORAL AS NEEDED
Status: DISCONTINUED | OUTPATIENT
Start: 2020-01-27 | End: 2020-01-27 | Stop reason: HOSPADM

## 2020-01-27 RX ORDER — DIPHENHYDRAMINE HYDROCHLORIDE 50 MG/ML
12.5 INJECTION INTRAMUSCULAR; INTRAVENOUS EVERY 4 HOURS PRN
Status: DISCONTINUED | OUTPATIENT
Start: 2020-01-27 | End: 2020-01-30

## 2020-01-27 RX ORDER — MAGNESIUM OXIDE 400 MG (241.3 MG MAGNESIUM) TABLET
3 TABLET NIGHTLY
Status: DISCONTINUED | OUTPATIENT
Start: 2020-01-27 | End: 2020-01-30

## 2020-01-27 RX ORDER — ROCURONIUM BROMIDE 10 MG/ML
INJECTION, SOLUTION INTRAVENOUS AS NEEDED
Status: DISCONTINUED | OUTPATIENT
Start: 2020-01-27 | End: 2020-01-27 | Stop reason: SURG

## 2020-01-27 RX ORDER — MORPHINE SULFATE 10 MG/ML
6 INJECTION, SOLUTION INTRAMUSCULAR; INTRAVENOUS EVERY 10 MIN PRN
Status: DISCONTINUED | OUTPATIENT
Start: 2020-01-27 | End: 2020-01-27 | Stop reason: HOSPADM

## 2020-01-27 RX ORDER — LOSARTAN POTASSIUM 100 MG/1
100 TABLET ORAL DAILY
Status: DISCONTINUED | OUTPATIENT
Start: 2020-01-28 | End: 2020-01-30

## 2020-01-27 RX ORDER — SODIUM CHLORIDE, SODIUM LACTATE, POTASSIUM CHLORIDE, CALCIUM CHLORIDE 600; 310; 30; 20 MG/100ML; MG/100ML; MG/100ML; MG/100ML
INJECTION, SOLUTION INTRAVENOUS CONTINUOUS
Status: DISCONTINUED | OUTPATIENT
Start: 2020-01-27 | End: 2020-01-27 | Stop reason: HOSPADM

## 2020-01-27 RX ORDER — ONDANSETRON 2 MG/ML
4 INJECTION INTRAMUSCULAR; INTRAVENOUS EVERY 4 HOURS PRN
Status: ACTIVE | OUTPATIENT
Start: 2020-01-27 | End: 2020-01-29

## 2020-01-27 RX ORDER — PROCHLORPERAZINE EDISYLATE 5 MG/ML
5 INJECTION INTRAMUSCULAR; INTRAVENOUS ONCE AS NEEDED
Status: DISCONTINUED | OUTPATIENT
Start: 2020-01-27 | End: 2020-01-27 | Stop reason: HOSPADM

## 2020-01-27 RX ORDER — METOPROLOL TARTRATE 5 MG/5ML
2.5 INJECTION INTRAVENOUS ONCE
Status: DISCONTINUED | OUTPATIENT
Start: 2020-01-27 | End: 2020-01-27 | Stop reason: HOSPADM

## 2020-01-27 RX ORDER — HYDROMORPHONE HYDROCHLORIDE 1 MG/ML
0.2 INJECTION, SOLUTION INTRAMUSCULAR; INTRAVENOUS; SUBCUTANEOUS EVERY 2 HOUR PRN
Status: DISCONTINUED | OUTPATIENT
Start: 2020-01-27 | End: 2020-01-30

## 2020-01-27 RX ORDER — DULOXETIN HYDROCHLORIDE 60 MG/1
60 CAPSULE, DELAYED RELEASE ORAL DAILY
Status: DISCONTINUED | OUTPATIENT
Start: 2020-01-28 | End: 2020-01-30

## 2020-01-27 RX ORDER — FAMOTIDINE 20 MG/1
20 TABLET ORAL ONCE
Status: COMPLETED | OUTPATIENT
Start: 2020-01-27 | End: 2020-01-27

## 2020-01-27 RX ORDER — HYDROCODONE BITARTRATE AND ACETAMINOPHEN 5; 325 MG/1; MG/1
2 TABLET ORAL AS NEEDED
Status: DISCONTINUED | OUTPATIENT
Start: 2020-01-27 | End: 2020-01-27 | Stop reason: HOSPADM

## 2020-01-27 RX ORDER — ENOXAPARIN SODIUM 100 MG/ML
40 INJECTION SUBCUTANEOUS DAILY
Status: DISCONTINUED | OUTPATIENT
Start: 2020-01-28 | End: 2020-01-30

## 2020-01-27 RX ORDER — POLYETHYLENE GLYCOL 3350 17 G/17G
17 POWDER, FOR SOLUTION ORAL DAILY PRN
Status: DISCONTINUED | OUTPATIENT
Start: 2020-01-27 | End: 2020-01-30

## 2020-01-27 RX ORDER — LIDOCAINE HYDROCHLORIDE 10 MG/ML
INJECTION, SOLUTION EPIDURAL; INFILTRATION; INTRACAUDAL; PERINEURAL AS NEEDED
Status: DISCONTINUED | OUTPATIENT
Start: 2020-01-27 | End: 2020-01-27 | Stop reason: SURG

## 2020-01-27 RX ORDER — OXYCODONE AND ACETAMINOPHEN 10; 325 MG/1; MG/1
1-2 TABLET ORAL EVERY 6 HOURS PRN
Qty: 60 TABLET | Refills: 0 | Status: SHIPPED | OUTPATIENT
Start: 2020-01-27 | End: 2020-06-19

## 2020-01-27 RX ORDER — OXYCODONE HCL 10 MG/1
10 TABLET, FILM COATED, EXTENDED RELEASE ORAL EVERY 12 HOURS
Status: DISCONTINUED | OUTPATIENT
Start: 2020-01-27 | End: 2020-01-30

## 2020-01-27 RX ORDER — SODIUM CHLORIDE, SODIUM LACTATE, POTASSIUM CHLORIDE, CALCIUM CHLORIDE 600; 310; 30; 20 MG/100ML; MG/100ML; MG/100ML; MG/100ML
INJECTION, SOLUTION INTRAVENOUS CONTINUOUS
Status: DISCONTINUED | OUTPATIENT
Start: 2020-01-27 | End: 2020-01-27 | Stop reason: ALTCHOICE

## 2020-01-27 RX ORDER — ZOLPIDEM TARTRATE 5 MG/1
20 TABLET ORAL NIGHTLY PRN
Status: DISCONTINUED | OUTPATIENT
Start: 2020-01-27 | End: 2020-01-30

## 2020-01-27 RX ADMIN — LIDOCAINE HYDROCHLORIDE 25 MG: 10 INJECTION, SOLUTION EPIDURAL; INFILTRATION; INTRACAUDAL; PERINEURAL at 09:48:00

## 2020-01-27 RX ADMIN — ROCURONIUM BROMIDE 10 MG: 10 INJECTION, SOLUTION INTRAVENOUS at 09:55:00

## 2020-01-27 RX ADMIN — TRANEXAMIC ACID 1000 MG: 10 INJECTION, SOLUTION INTRAVENOUS at 10:26:00

## 2020-01-27 RX ADMIN — ROCURONIUM BROMIDE 20 MG: 10 INJECTION, SOLUTION INTRAVENOUS at 10:02:00

## 2020-01-27 RX ADMIN — LIDOCAINE HYDROCHLORIDE 50 MG: 10 INJECTION, SOLUTION EPIDURAL; INFILTRATION; INTRACAUDAL; PERINEURAL at 09:55:00

## 2020-01-27 RX ADMIN — MIDAZOLAM HYDROCHLORIDE 2 MG: 1 INJECTION INTRAMUSCULAR; INTRAVENOUS at 09:39:00

## 2020-01-27 RX ADMIN — NEOSTIGMINE METHYLSULFATE 4 MG: 0.5 INJECTION INTRAVENOUS at 11:38:00

## 2020-01-27 RX ADMIN — SODIUM CHLORIDE, SODIUM LACTATE, POTASSIUM CHLORIDE, CALCIUM CHLORIDE: 600; 310; 30; 20 INJECTION, SOLUTION INTRAVENOUS at 11:57:00

## 2020-01-27 RX ADMIN — ONDANSETRON 4 MG: 2 INJECTION INTRAMUSCULAR; INTRAVENOUS at 11:31:00

## 2020-01-27 RX ADMIN — DEXAMETHASONE SODIUM PHOSPHATE 4 MG: 4 MG/ML VIAL (ML) INJECTION at 09:57:00

## 2020-01-27 RX ADMIN — GLYCOPYRROLATE 0.6 MG: 0.2 INJECTION INTRAMUSCULAR; INTRAVENOUS at 11:38:00

## 2020-01-27 RX ADMIN — BUPIVACAINE HYDROCHLORIDE 1.5 ML: 7.5 INJECTION, SOLUTION INTRASPINAL at 09:55:00

## 2020-01-27 NOTE — ANESTHESIA PREPROCEDURE EVALUATION
Anesthesia PreOp Note    HPI:     Lily Conner Holstein is a 61year old female who presents for preoperative consultation requested by: Melina Celis MD    Date of Surgery: 1/27/2020    Procedure(s):  HIP TOTAL REPLACEMENT  Indication: left hip degenerative (supraventricular tachycardia) ([facility] [address]    • Visual impairment     glasses       Past Surgical History:   Procedure Laterality Date   • COLONOSCOPY     • EYE SURGERY     • HERNIA SURGERY      umbilical   • OTHER      IVC filter 1/15/2020   • TOTAL KNEE REPLAC 1/21/2020 at Unknown time  HYDROcodone-acetaminophen (NORCO)  MG Oral Tab, Take 2 tablets by mouth every 6 (six) hours as needed. , Disp: , Rfl: , 1/27/2020 at 530  losartan (COZAAR) 100 MG Oral Tab, Take 1 tablet by mouth daily. , Disp: , Rfl: , 1/26/ Social History    Socioeconomic History      Marital status:       Spouse name: Not on file      Number of children: Not on file      Years of education: Not on file      Highest education level: Not on file    Occupational History      Not on 01/07/2020    CA 9.4 01/07/2020     Lab Results   Component Value Date    INR 2.10 (H) 01/07/2020       Vital Signs: Body mass index is 39.42 kg/m². height is 1.778 m (5' 10\") and weight is 124.6 kg (274 lb 11.2 oz).  Her oral temperature is 98 °F (36.7

## 2020-01-27 NOTE — PLAN OF CARE
Problem: Patient Centered Care  Goal: Patient preferences are identified and integrated in the patient's plan of care  Description  Interventions:  - What would you like us to know as we care for you?   - Provide timely, complete, and accurate informatio distraction and/or relaxation techniques  - Monitor for opioid side effects  - Notify MD/LIP if interventions unsuccessful or patient reports new pain  - Anticipate increased pain with activity and pre-medicate as appropriate  Outcome: Progressing     Prob equipment as needed  - Ensure adequate protection for wounds/incisions during mobilization  - Obtain PT/OT consults as needed  - Advance activity as appropriate  - Communicate ordered activity level and limitations with patient/family  Outcome: Progressing alarm is in use,call light within reach. Bed is in lowest position. Hip posterior approach replacement pathway given and reviewed. Dr. Zheng Rand NP Gayle Samaniego notified about pt's admit to the unit. Home meds reconciled.   Placed on contact enteric isolation due t

## 2020-01-27 NOTE — INTERVAL H&P NOTE
Pre-op Diagnosis: left hip degenerative joint disease    The above referenced H&P was reviewed by Tatiana Esparza PA-C on 1/27/2020, the patient was examined and no significant changes have occurred in the patient's condition since the H&P was performed.   I

## 2020-01-27 NOTE — ANESTHESIA PROCEDURE NOTES
Spinal Block  Performed by: Rigo Christensen, CRNA  Authorized by: Esther Waterman MD       General Information and Staff    Start Time:  1/27/2020 9:48 AM  End Time:  1/27/2020 9:55 AM  Anesthesiologist:  Esther Waterman MD  CRNA:  Jasmina Villegas

## 2020-01-27 NOTE — CM/SW NOTE
SW received MDO for s/p total joint. SW met with the pt to confirm pt's information and home situation. Pt states she lives in a 1 story ranch. Pt lives there with her ex  who rents a room from her.  Pt uses a walker/ cane at home prior to surger

## 2020-01-27 NOTE — PHYSICAL THERAPY NOTE
PHYSICAL THERAPY HIP EVALUATION - INPATIENT     Room Number: 996/702-P  Evaluation Date: 1/27/2020  Type of Evaluation: Initial  Physician Order: PT Eval and Treat    Presenting Problem: L MICHELLE-posterior  Reason for Therapy: Mobility Dysfunction and Dischar maintain static standing with bilateral UE support on RW approx 5 minute prior to mobilization- initiation of lateral weight shift to prepare to walk.  Increased time for stand to sit position, patient became anxious that chair was too low- therapist buildi complication     hx of difficult intubation   • Anxiety state    • Arrhythmia    • Asthma     O2 use PRN on days, continuous at HS   • Back problem    • Constipation    • Deep vein blood clot of left lower extremity (HCC)     IVC filter placed 1/15/2020 Techniques: Activity promotion; Body mechanics;Repositioning;Breathing techniques    COGNITION  · Overall Cognitive Status:  WFL - within functional limits    RANGE OF MOTION AND STRENGTH ASSESSMENT  Upper extremity ROM and strength are within functional li Up in chair;Call light within reach; Needs met;RN aware of session/findings;Bracing education provided    CURRENT GOALS    Goals to be met by: 2/10/2020  Patient Goal Patient's self-stated goal is: return to PLOF   Goal #1 Patient is able to demonstrate sup

## 2020-01-27 NOTE — PROGRESS NOTES
[facility] HOSP - [facility]    Ortho Medical Progress Note     Chilton Danker Holstein Patient Status:  Inpatient    10/26/1960 MRN [id number]   Location [facility] 4W/SW/SE Attending Milton Jones MD   Hosp Day # 0 CHADD Blackman Date    WBC 8.6 01/07/2020    HGB 12.5 01/27/2020    HCT 38.1 01/27/2020    .0 01/07/2020    CREATSERUM 0.73 01/27/2020    BUN 16 01/27/2020     01/27/2020    K 4.5 01/27/2020     01/27/2020    CO2 29.0 01/27/2020     (H) 01/27/20

## 2020-01-27 NOTE — ANESTHESIA POSTPROCEDURE EVALUATION
Patient: Alphonse Mu Holstein    Procedure Summary     Date:  01/27/20 Room / Location:  [address] [place] [address] MAIN OR 04 / [facility] MAIN OR    Anesthesia Start:  0338 Anesthesia Stop:  4874    Procedure:  HIP TOTAL REPLACEMENT (Left ) Diagnosis:  (left hip degenerative joint disea

## 2020-01-27 NOTE — ANESTHESIA PROCEDURE NOTES
Airway  Date/Time: 1/27/2020 9:59 AM  Urgency: Elective      General Information and Staff    Patient location during procedure: OR  Anesthesiologist: Esther Watermna MD  Resident/CRNA: Rigo Christensen CRNA  Performed: CRNA and anesthesiologist

## 2020-01-28 LAB
ANION GAP SERPL CALC-SCNC: 3 MMOL/L (ref 0–18)
BUN BLD-MCNC: 13 MG/DL (ref 7–18)
BUN/CREAT SERPL: 18.8 (ref 10–20)
CALCIUM BLD-MCNC: 8.7 MG/DL (ref 8.5–10.1)
CHLORIDE SERPL-SCNC: 107 MMOL/L (ref 98–112)
CO2 SERPL-SCNC: 31 MMOL/L (ref 21–32)
CREAT BLD-MCNC: 0.69 MG/DL (ref 0.55–1.02)
DEPRECATED RDW RBC AUTO: 45.4 FL (ref 35.1–46.3)
ERYTHROCYTE [DISTWIDTH] IN BLOOD BY AUTOMATED COUNT: 12.5 % (ref 11–15)
GLUCOSE BLD-MCNC: 138 MG/DL (ref 70–99)
GLUCOSE BLDC GLUCOMTR-MCNC: 111 MG/DL (ref 70–99)
GLUCOSE BLDC GLUCOMTR-MCNC: 115 MG/DL (ref 70–99)
GLUCOSE BLDC GLUCOMTR-MCNC: 144 MG/DL (ref 70–99)
GLUCOSE BLDC GLUCOMTR-MCNC: 91 MG/DL (ref 70–99)
HCT VFR BLD AUTO: 33 % (ref 35–48)
HGB BLD-MCNC: 10.7 G/DL (ref 12–16)
INR BLD: 0.98 (ref 0.9–1.2)
MCH RBC QN AUTO: 31.9 PG (ref 26–34)
MCHC RBC AUTO-ENTMCNC: 32.4 G/DL (ref 31–37)
MCV RBC AUTO: 98.5 FL (ref 80–100)
OSMOLALITY SERPL CALC.SUM OF ELEC: 294 MOSM/KG (ref 275–295)
PLATELET # BLD AUTO: 324 10(3)UL (ref 150–450)
POTASSIUM SERPL-SCNC: 4.3 MMOL/L (ref 3.5–5.1)
PROTHROMBIN TIME: 12.8 SECONDS (ref 11.8–14.5)
RBC # BLD AUTO: 3.35 X10(6)UL (ref 3.8–5.3)
SODIUM SERPL-SCNC: 141 MMOL/L (ref 136–145)
WBC # BLD AUTO: 12.7 X10(3) UL (ref 4–11)

## 2020-01-28 PROCEDURE — 82962 GLUCOSE BLOOD TEST: CPT

## 2020-01-28 PROCEDURE — 85610 PROTHROMBIN TIME: CPT | Performed by: PHYSICIAN ASSISTANT

## 2020-01-28 PROCEDURE — 97535 SELF CARE MNGMENT TRAINING: CPT

## 2020-01-28 PROCEDURE — 80048 BASIC METABOLIC PNL TOTAL CA: CPT | Performed by: NURSE PRACTITIONER

## 2020-01-28 PROCEDURE — 97116 GAIT TRAINING THERAPY: CPT

## 2020-01-28 PROCEDURE — 85027 COMPLETE CBC AUTOMATED: CPT | Performed by: NURSE PRACTITIONER

## 2020-01-28 PROCEDURE — 97165 OT EVAL LOW COMPLEX 30 MIN: CPT

## 2020-01-28 PROCEDURE — 97530 THERAPEUTIC ACTIVITIES: CPT

## 2020-01-28 PROCEDURE — 97110 THERAPEUTIC EXERCISES: CPT

## 2020-01-28 RX ORDER — WARFARIN SODIUM 7.5 MG/1
7.5 TABLET ORAL NIGHTLY
Status: DISCONTINUED | OUTPATIENT
Start: 2020-01-29 | End: 2020-01-29

## 2020-01-28 RX ORDER — WARFARIN SODIUM 10 MG/1
10 TABLET ORAL NIGHTLY
Status: COMPLETED | OUTPATIENT
Start: 2020-01-28 | End: 2020-01-28

## 2020-01-28 NOTE — PHYSICAL THERAPY NOTE
PHYSICAL THERAPY HIP TREATMENT NOTE - INPATIENT    Room Number: 151/319-S            Presenting Problem: L MICHELLE-posterior    Problem List  Principal Problem:    Osteoarthritis of left hip  Active Problems:    Benign hypertension    Dyslipidemia    Morbid ob AM-PAC '6-Clicks' INPATIENT SHORT FORM - BASIC MOBILITY  How much difficulty does the patient currently have. ..  -   Turning over in bed (including adjusting bedclothes, sheets and blankets)?: A Little   -   Sitting down on and standing up from a kalpesh Patient verbalizes and/or demonstrates all precautions and safety concerns independently   Goal #5   Current Status In progress   Goal #6 Patient independently performs home exercise program for ROM/strengthening per the instructions provided in preparatio

## 2020-01-28 NOTE — CM/SW NOTE
Per PT/OT rec, pt needs ROHAN. SULEMA sent referral to [facility] for clinical review. SULEMA called [phone] ([facility]) to request a DON screen at ([id number])    PLAN: [facility] - Pending acceptance     Hanna Galan, DIONEW, MSW ext.  1

## 2020-01-28 NOTE — PLAN OF CARE
Patient alert and oriented. Room air. Denies numbness or tingling. Tele in place #53 - no calls. SCD's bilaterally, Coumadin and Lovenox for DVT prophylaxis. Voiding. Up with 2 assist to the rolling chair. Dressing C/D/I. Pain controlled.  Patient on enteri distraction and/or relaxation techniques  - Monitor for opioid side effects  - Notify MD/LIP if interventions unsuccessful or patient reports new pain  - Anticipate increased pain with activity and pre-medicate as appropriate  Outcome: Progressing     Prob equipment as needed  - Ensure adequate protection for wounds/incisions during mobilization  - Obtain PT/OT consults as needed  - Advance activity as appropriate  - Communicate ordered activity level and limitations with patient/family  Outcome: Progressing

## 2020-01-28 NOTE — PLAN OF CARE
Pain managed with PRN percocet Voiding freely. Up 1/walker. She would like to d/c to rehab, MD aware, PT to make recommendation.  This AM, patient found with open bottle of Norco. States she did not take any, only that she brought it for after discharge and anxiety  - Utilize distraction and/or relaxation techniques  - Monitor for opioid side effects  - Notify MD/LIP if interventions unsuccessful or patient reports new pain  - Anticipate increased pain with activity and pre-medicate as appropriate  Outcome: P and precautions (e.g. spinal or hip dislocation precautions)  Outcome: Progressing     Problem: Impaired Functional Mobility  Goal: Achieve highest/safest level of mobility/gait  Description  Interventions:  - Assess patient's functional ability and stabil

## 2020-01-28 NOTE — PROGRESS NOTES
[facility]    Ortho Medical Progress Note     [facility] Patient Status:  In[facility]    10/26/1960 MRN [id number]   Location [facility] 4W/SW/SE Attending Aaron Flores,  [facility] Day # 1 PCP Dayan Jordan Value Date    WBC 12.7 (H) 01/28/2020    HGB 10.7 (L) 01/28/2020    HCT 33.0 (L) 01/28/2020    .0 01/28/2020    CREATSERUM 0.69 01/28/2020    BUN 13 01/28/2020     01/28/2020    K 4.3 01/28/2020     01/28/2020    CO2 31.0 01/28/2020    G

## 2020-01-28 NOTE — OCCUPATIONAL THERAPY NOTE
OCCUPATIONAL THERAPY EVALUATION - INPATIENT      Room Number: 418/418-A  Evaluation Date: 1/28/2020  Type of Evaluation: Initial       Physician Order: IP Consult to Occupational Therapy  Reason for Therapy: ADL/IADL Dysfunction and Discharge Planning    O tasks with Min A for clothing management, grooming at sink with CGA. Patient returned back to bedside chair and set up with all needs in reach; stable upon exit.  Continue skilled occupational therapy while IP to maximize patient function and increase patie History  Past Surgical History:   Procedure Laterality Date   • COLONOSCOPY     • EYE SURGERY     • HERNIA SURGERY      umbilical   • HIP TOTAL REPLACEMENT Left 1/27/2020    Performed by Tami Boggs MD at [address] [place] [address] OR   • OTHER      IVC filter 1/15/2020 regular lower body clothing?: A Lot  -   Bathing (including washing, rinsing, drying)?: A Little  -   Toileting, which includes using toilet, bedpan or urinal? : A Little  -   Putting on and taking off regular upper body clothing?: A Little  -   Taking car

## 2020-01-28 NOTE — OPERATIVE REPORT
[address] [place] [address] [address]  OPERATIVE REPORT  PATIENT NAME: Ashlee Santoyo  MR#: [id number]  ATTENDING MD: Jayson Marquez  DATE OF PROCEDURE: 1/27/2020  PREOPERATIVE DIAGNOSIS: Degenerative Arthritis Left Hip acetabular and femoral component was able to be placed in stable position. PROCEDURE: The patient was admitted to the same day surgery program on Monday, January 27, 2020.  Following proper identification in the preoperative holding area with confirmation Hemispherical Shell acetabular component was placed in 45 degrees of verticality and 20 degrees of anteversion. This was seated and gave excellent initial stability. 2 Screws were used. Subsequently, a 54/40 polyethylene liner was inserted.   Attention was

## 2020-01-28 NOTE — PROGRESS NOTES
[facility]    Progress Note    Cloyce Payor Holstein Patient Status:  Inpatient    10/26/1960 MRN [id number]   Location [facility] 4W/SW/SE Attending Gordo Echeverria MD   Hosp Day # 1 PCP Mohini Brooks       Subjective:   Sophie Avila [phone]

## 2020-01-29 LAB
GLUCOSE BLDC GLUCOMTR-MCNC: 100 MG/DL (ref 70–99)
GLUCOSE BLDC GLUCOMTR-MCNC: 101 MG/DL (ref 70–99)
GLUCOSE BLDC GLUCOMTR-MCNC: 116 MG/DL (ref 70–99)
GLUCOSE BLDC GLUCOMTR-MCNC: 119 MG/DL (ref 70–99)
INR BLD: 1.11 (ref 0.9–1.2)
PROTHROMBIN TIME: 14.2 SECONDS (ref 11.8–14.5)

## 2020-01-29 PROCEDURE — 97116 GAIT TRAINING THERAPY: CPT

## 2020-01-29 PROCEDURE — 97110 THERAPEUTIC EXERCISES: CPT

## 2020-01-29 PROCEDURE — 82962 GLUCOSE BLOOD TEST: CPT

## 2020-01-29 PROCEDURE — 85610 PROTHROMBIN TIME: CPT | Performed by: PHYSICIAN ASSISTANT

## 2020-01-29 PROCEDURE — 97535 SELF CARE MNGMENT TRAINING: CPT

## 2020-01-29 RX ORDER — WARFARIN SODIUM 10 MG/1
10 TABLET ORAL NIGHTLY
Status: COMPLETED | OUTPATIENT
Start: 2020-01-29 | End: 2020-01-29

## 2020-01-29 RX ORDER — WARFARIN SODIUM 7.5 MG/1
7.5 TABLET ORAL NIGHTLY
Status: DISCONTINUED | OUTPATIENT
Start: 2020-01-30 | End: 2020-01-30

## 2020-01-29 NOTE — OCCUPATIONAL THERAPY NOTE
OCCUPATIONAL THERAPY TREATMENT NOTE - INPATIENT    Room Number: 418/418-A               Problem List  Principal Problem:    Osteoarthritis of left hip  Active Problems:    Benign hypertension    Dyslipidemia    Morbid obesity ([address]    Obstructive sleep apn Restriction: L lower extremity           L Lower Extremity: Weight Bearing as Tolerated    PAIN ASSESSMENT  Ratin  Location: LLE  Management Techniques:  Activity promotion;Repositioning           ACTIVITIES OF DAILY LIVING ASSESSMENT  AM-PAC ‘6-Clicks’

## 2020-01-29 NOTE — PLAN OF CARE
Patient up 1 x walker. Percocet tablets given for pain control. ACHS per order. CPAP @ HS. Tele #53, no calls received. Voiding freely. Plan for rehab tomorrow.    Problem: Patient Centered Care  Goal: Patient preferences are identified and integrated in th measures as appropriate and evaluate response  - Consider cultural and social influences on pain and pain management  - Manage/alleviate anxiety  - Utilize distraction and/or relaxation techniques  - Monitor for opioid side effects  - Notify MD/LIP if inte activity tolerance for standing, transferring and ambulating w/ or w/o assistive devices  - Assist with transfers and ambulation using safe patient handling equipment as needed  - Ensure adequate protection for wounds/incisions during mobilization  - Glenna Koenig

## 2020-01-29 NOTE — PHYSICAL THERAPY NOTE
PHYSICAL THERAPY HIP TREATMENT NOTE - INPATIENT    Room Number: 017/489-Y            Presenting Problem: L MICHELLE-posterior    Problem List  Principal Problem:    Osteoarthritis of left hip  Active Problems:    Benign hypertension    Dyslipidemia    Morbid ob INPATIENT SHORT FORM - BASIC MOBILITY  How much difficulty does the patient currently have. ..  -   Turning over in bed (including adjusting bedclothes, sheets and blankets)?: A Little   -   Sitting down on and standing up from a chair with arms (e.g., whee demonstrates all precautions and safety concerns independently   Goal #5   Current Status In progress   Goal #6 Patient independently performs home exercise program for ROM/strengthening per the instructions provided in preparation for discharge.    Goal #6

## 2020-01-29 NOTE — CM/SW NOTE
SW confirmed with RN that pt is medically ready for discharge today. SULEMA called and spoke with Claribel/ liaison from [place] to arrange a time for discharge.  [facility] can accept the pt at 1:30PM. RN is aware of discharge time and location

## 2020-01-29 NOTE — PLAN OF CARE
Pt is POD 2, Aox4, voiding freely, up with one assist and walker, tolerating PO pain medication, tele monitor in place, glucose checks ACHS, d/c plan to rehab facility of patient's choice after 3 midnights so sometime tomorrow    Problem: Patient Centered assistance  - Assess pain using appropriate pain scale  - Administer analgesics based on type and severity of pain and evaluate response  - Implement non-pharmacological measures as appropriate and evaluate response  - Consider cultural and social influenc indicated  Outcome: Progressing     Problem: MUSCULOSKELETAL - ADULT  Goal: Return mobility to safest level of function  Description  INTERVENTIONS:  - Assess patient stability and activity tolerance for standing, transferring and ambulating w/ or w/o assi

## 2020-01-29 NOTE — PROGRESS NOTES
[facility]    Ortho Medical Progress Note     Parker Danker Holstein Patient Status:  Inpatient    10/26/1960 MRN [id number]   Location [facility] 4W/SW/SE Attending Milton Jones, [facility] Day # 2 PCP Kai Blackman 01/28/2020    .0 01/28/2020    CREATSERUM 0.69 01/28/2020    BUN 13 01/28/2020     01/28/2020    K 4.3 01/28/2020     01/28/2020    CO2 31.0 01/28/2020     (H) 01/28/2020    CA 8.7 01/28/2020    ALB 3.6 01/27/2020    ALKPHO 67 01/

## 2020-01-30 VITALS
DIASTOLIC BLOOD PRESSURE: 84 MMHG | WEIGHT: 274.69 LBS | SYSTOLIC BLOOD PRESSURE: 121 MMHG | BODY MASS INDEX: 39.33 KG/M2 | HEIGHT: 70 IN | RESPIRATION RATE: 18 BRPM | HEART RATE: 65 BPM | OXYGEN SATURATION: 100 % | TEMPERATURE: 99 F

## 2020-01-30 LAB
GLUCOSE BLDC GLUCOMTR-MCNC: 104 MG/DL (ref 70–99)
GLUCOSE BLDC GLUCOMTR-MCNC: 99 MG/DL (ref 70–99)
INR BLD: 1.21 (ref 0.9–1.2)
PROTHROMBIN TIME: 15.2 SECONDS (ref 11.8–14.5)

## 2020-01-30 PROCEDURE — 97116 GAIT TRAINING THERAPY: CPT

## 2020-01-30 PROCEDURE — 97110 THERAPEUTIC EXERCISES: CPT

## 2020-01-30 PROCEDURE — 82962 GLUCOSE BLOOD TEST: CPT

## 2020-01-30 PROCEDURE — 85610 PROTHROMBIN TIME: CPT | Performed by: PHYSICIAN ASSISTANT

## 2020-01-30 NOTE — PROGRESS NOTES
[facility]    Ortho Medical Progress Note     Alphonse Mu Holstein Patient Status:  Inpatient    10/26/1960 MRN [id number]   Location [facility] 4W/SW/SE Attending Eunice Gregorio, [address] [facility] Day # 3 CHADD Hughes 0.69 01/28/2020    BUN 13 01/28/2020     01/28/2020    K 4.3 01/28/2020     01/28/2020    CO2 31.0 01/28/2020     (H) 01/28/2020    CA 8.7 01/28/2020    ALB 3.6 01/27/2020    ALKPHO 67 01/27/2020    BILT 0.4 01/27/2020    TP 7.1 01/27/20

## 2020-01-30 NOTE — PLAN OF CARE
Patient up 1 x walker. RA. Tele #53. Coumadin and lovonox for DVT prevention. Voiding freely. 2 percocet. ACHS per order. Transferring to rehab this afternoon.    Problem: Patient Centered Care  Goal: Patient preferences are identified and integrated in the measures as appropriate and evaluate response  - Consider cultural and social influences on pain and pain management  - Manage/alleviate anxiety  - Utilize distraction and/or relaxation techniques  - Monitor for opioid side effects  - Notify MD/LIP if inte activity tolerance for standing, transferring and ambulating w/ or w/o assistive devices  - Assist with transfers and ambulation using safe patient handling equipment as needed  - Ensure adequate protection for wounds/incisions during mobilization  - Carlos Fields

## 2020-01-30 NOTE — PLAN OF CARE
Comments: Comments: Pt is alert, calling appropriately for assistance. Sats well on R/A. Vitals monitored. Coum/Lov for DVT prophx. Voiding ok of urine. Accucheck AC/HS. OOB x1/SBA.  Aquacell over site reinforced overnight (rolling), will order new aquacell response  - Consider cultural and social influences on pain and pain management  - Manage/alleviate anxiety  - Utilize distraction and/or relaxation techniques  - Monitor for opioid side effects  - Notify MD/LIP if interventions unsuccessful or patient rep transferring and ambulating w/ or w/o assistive devices  - Assist with transfers and ambulation using safe patient handling equipment as needed  - Ensure adequate protection for wounds/incisions during mobilization  - Obtain PT/OT consults as needed  - Adv

## 2020-01-30 NOTE — PHYSICAL THERAPY NOTE
PHYSICAL THERAPY HIP TREATMENT NOTE - INPATIENT    Room Number: 291/110-N            Presenting Problem: L MICHELLE-posterior    Problem List  Principal Problem:    Osteoarthritis of left hip  Active Problems:    Benign hypertension    Dyslipidemia    Morbid ob Lying    O2 WALK                  AM-PAC '6-Clicks' INPATIENT SHORT FORM - BASIC MOBILITY  How much difficulty does the patient currently have. ..  -   Turning over in bed (including adjusting bedclothes, sheets and blankets)?: A Little   -   Sitting down o CG   Goal #4   Current Status    Goal #5 Patient verbalizes and/or demonstrates all precautions and safety concerns independently   Goal #5   Current Status In progress   Goal #6 Patient independently performs home exercise program for ROM/strengthening pe

## 2020-06-06 ENCOUNTER — TELEPHONE (OUTPATIENT)
Dept: FAMILY MEDICINE CLINIC | Facility: CLINIC | Age: 60
End: 2020-06-06

## 2020-06-06 NOTE — TELEPHONE ENCOUNTER
Patient having surgery on DOS 06/22/20 RTHA with [email or web address]      H&P-Complete  LABS-RDW-SD(50.0)Glucose(107)HA1C(6.3),BUN/Crea(22.0)AST(10)A/G(0.9)INR(2.54)PT(26.9)PTT(40.1)  EKG-01/07/20-Abnormal  XRAY-01/07/20-WNL    Pulmonary Clearance from 01/07/20

## 2020-06-09 ENCOUNTER — LAB ENCOUNTER (OUTPATIENT)
Dept: LAB | Facility: HOSPITAL | Age: 60
End: 2020-06-09
Attending: FAMILY MEDICINE
Payer: MEDICARE

## 2020-06-09 ENCOUNTER — OFFICE VISIT (OUTPATIENT)
Dept: FAMILY MEDICINE CLINIC | Facility: CLINIC | Age: 60
End: 2020-06-09
Payer: MEDICARE

## 2020-06-09 VITALS
SYSTOLIC BLOOD PRESSURE: 120 MMHG | BODY MASS INDEX: 39.08 KG/M2 | DIASTOLIC BLOOD PRESSURE: 80 MMHG | OXYGEN SATURATION: 98 % | HEART RATE: 58 BPM | RESPIRATION RATE: 16 BRPM | HEIGHT: 70 IN | WEIGHT: 273 LBS | TEMPERATURE: 99 F

## 2020-06-09 DIAGNOSIS — J45.20 MILD INTERMITTENT REACTIVE AIRWAY DISEASE WITHOUT COMPLICATION: ICD-10-CM

## 2020-06-09 DIAGNOSIS — G47.00 PERSISTENT INSOMNIA: ICD-10-CM

## 2020-06-09 DIAGNOSIS — Z01.812 PRE-OPERATIVE LABORATORY EXAMINATION: ICD-10-CM

## 2020-06-09 DIAGNOSIS — I87.2 VENOUS INSUFFICIENCY OF BOTH LOWER EXTREMITIES: ICD-10-CM

## 2020-06-09 DIAGNOSIS — E66.01 MORBID OBESITY (HCC): ICD-10-CM

## 2020-06-09 DIAGNOSIS — Z79.02 LONG TERM CURRENT USE OF ANTITHROMBOTICS/ANTIPLATELETS: ICD-10-CM

## 2020-06-09 DIAGNOSIS — E78.2 MIXED HYPERLIPIDEMIA: Chronic | ICD-10-CM

## 2020-06-09 DIAGNOSIS — Z86.711 HISTORY OF PULMONARY EMBOLUS (PE): ICD-10-CM

## 2020-06-09 DIAGNOSIS — R73.01 ELEVATED FASTING GLUCOSE: ICD-10-CM

## 2020-06-09 DIAGNOSIS — M16.11 PRIMARY OSTEOARTHRITIS OF RIGHT HIP: Primary | ICD-10-CM

## 2020-06-09 DIAGNOSIS — Z01.818 OTHER SPECIFIED PRE-OPERATIVE EXAMINATION: ICD-10-CM

## 2020-06-09 DIAGNOSIS — I47.1 SUPRAVENTRICULAR TACHYCARDIA (HCC): ICD-10-CM

## 2020-06-09 DIAGNOSIS — M35.3 PMR (POLYMYALGIA RHEUMATICA) (HCC): ICD-10-CM

## 2020-06-09 DIAGNOSIS — R25.2 CRAMPS, EXTREMITY: ICD-10-CM

## 2020-06-09 DIAGNOSIS — M47.816 LUMBAR SPONDYLOSIS: ICD-10-CM

## 2020-06-09 DIAGNOSIS — G47.33 OBSTRUCTIVE SLEEP APNEA SYNDROME: ICD-10-CM

## 2020-06-09 DIAGNOSIS — E78.5 DYSLIPIDEMIA: ICD-10-CM

## 2020-06-09 DIAGNOSIS — I10 BENIGN HYPERTENSION: ICD-10-CM

## 2020-06-09 DIAGNOSIS — Z96.642 HISTORY OF LEFT HIP REPLACEMENT: ICD-10-CM

## 2020-06-09 DIAGNOSIS — E11.9 TYPE 2 DIABETES MELLITUS WITHOUT COMPLICATION, WITHOUT LONG-TERM CURRENT USE OF INSULIN (HCC): ICD-10-CM

## 2020-06-09 PROCEDURE — 86901 BLOOD TYPING SEROLOGIC RH(D): CPT

## 2020-06-09 PROCEDURE — 86850 RBC ANTIBODY SCREEN: CPT

## 2020-06-09 PROCEDURE — 86900 BLOOD TYPING SEROLOGIC ABO: CPT

## 2020-06-09 PROCEDURE — 85610 PROTHROMBIN TIME: CPT

## 2020-06-09 PROCEDURE — 36415 COLL VENOUS BLD VENIPUNCTURE: CPT

## 2020-06-09 PROCEDURE — 85025 COMPLETE CBC W/AUTO DIFF WBC: CPT

## 2020-06-09 PROCEDURE — 87081 CULTURE SCREEN ONLY: CPT

## 2020-06-09 PROCEDURE — 83036 HEMOGLOBIN GLYCOSYLATED A1C: CPT

## 2020-06-09 PROCEDURE — 99214 OFFICE O/P EST MOD 30 MIN: CPT | Performed by: FAMILY MEDICINE

## 2020-06-09 PROCEDURE — 81015 MICROSCOPIC EXAM OF URINE: CPT

## 2020-06-09 PROCEDURE — 80053 COMPREHEN METABOLIC PANEL: CPT

## 2020-06-09 PROCEDURE — 85730 THROMBOPLASTIN TIME PARTIAL: CPT

## 2020-06-09 NOTE — H&P (VIEW-ONLY)
[facility] PRE-OP CLINIC [place]    PRE-OP NOTE    HPI:   I have been consulted by Dr. Elmira De La Rosa [facility] 61[facility] for a preoperative evaluation and medical clearance.  She has a long history of worsening severe degenerative arthritis Warfarin Sodium 7.5 MG Oral Tab Take 7.5 mg by mouth nightly. Coumadin 10mg Mon and Friday  Coumadin 7.5mg Tues,Wed and Thurs      • losartan (COZAAR) 100 MG Oral Tab Take 1 tablet by mouth daily.      • diphenhydrAMINE (BENADRYL ALLERGY) 25 MG Oral Tab [facility] • Difficult intubation    • Esophageal reflux    • Essential hypertension    • High blood pressure    • High cholesterol    • Hyperlipidemia    • Hypogammaglobulinemia (HCC)    • Osteoarthritis    • Palpitations    • Pneumonia     02/20   • Pulmonary emb Fear of current or ex partner: Not on file        Emotionally abused: Not on file        Physically abused: Not on file        Forced sexual activity: Not on file    Other Topics      Concerns:        Not on file    Social History Narrative      Not on lindy dentition. NECK: Supple, no CLAD, no JVD, no carotid bruit, no thyromegaly. SKIN: No rashes, no skin lesion, no bruising, good turgor. HEART:  Regular rate and rhythm, no murmurs, rubs or gallops.   LUNGS: Clear to auscultation bilterally, no rales/rhonc ([facility]    (G47.00) Persistent insomnia    (R25.2) Cramps, extremity    (M47.816) Lumbar spondylosis    (E78.5) Dyslipidemia    (E66.01) Morbid obesity ([facility]    (A47.548) History of left hip replacement    (M35.3) PMR (polymyalgia rheumatica) ([facility])    (J45.2 medications and supplements with anticoagulation properties as per instruction    Postoperative Recommendations:    Anticoagulation / DVT prophylaxis: SCDs and Warfarin    GI protection: Protonix  Incentive Spirometry   Telemetry as needed  CPAP/O2 as need

## 2020-06-09 NOTE — H&P
[facility] PRE-OP CLINIC [place]    PRE-OP NOTE    HPI:   I have been consulted by Dr. Venus Bernal to see Deyanne Server Holstein 61year old female for a preoperative evaluation and medical clearance.  She has a long history of worsening severe degenerative arthritis Warfarin Sodium 7.5 MG Oral Tab Take 7.5 mg by mouth nightly. Coumadin 10mg Mon and Friday  Coumadin 7.5mg Tues,Wed and Thurs      • losartan (COZAAR) 100 MG Oral Tab Take 1 tablet by mouth daily.      • diphenhydrAMINE (BENADRYL ALLERGY) 25 MG Oral Tab [facility] • Difficult intubation    • Esophageal reflux    • Essential hypertension    • High blood pressure    • High cholesterol    • Hyperlipidemia    • Hypogammaglobulinemia (HCC)    • Osteoarthritis    • Palpitations    • Pneumonia     02/20   • Pulmonary emb Fear of current or ex partner: Not on file        Emotionally abused: Not on file        Physically abused: Not on file        Forced sexual activity: Not on file    Other Topics      Concerns:        Not on file    Social History Narrative      Not on lindy dentition. NECK: Supple, no CLAD, no JVD, no carotid bruit, no thyromegaly. SKIN: No rashes, no skin lesion, no bruising, good turgor. HEART:  Regular rate and rhythm, no murmurs, rubs or gallops.   LUNGS: Clear to auscultation bilterally, no rales/rhonc ([facility] [address]    (G47.00) Persistent insomnia    (R25.2) Cramps, extremity    (M47.816) Lumbar spondylosis    (E78.5) Dyslipidemia    (E66.01) Morbid obesity ([facility] [address]    (A21.402) History of left hip replacement    (M35.3) PMR (polymyalgia rheumatica) ([facility])    (J45.2 medications and supplements with anticoagulation properties as per instruction    Postoperative Recommendations:    Anticoagulation / DVT prophylaxis: SCDs and Warfarin    GI protection: Protonix  Incentive Spirometry   Telemetry as needed  CPAP/O2 as need

## 2020-06-16 PROBLEM — M16.11 PRIMARY LOCALIZED OSTEOARTHRITIS OF RIGHT HIP: Chronic | Status: ACTIVE | Noted: 2020-06-16

## 2020-06-19 PROBLEM — M16.11 OSTEOARTHRITIS OF RIGHT HIP: Chronic | Status: ACTIVE | Noted: 2020-06-16

## 2020-06-19 RX ORDER — CEFDINIR 300 MG/1
300 CAPSULE ORAL 2 TIMES DAILY
COMMUNITY

## 2020-06-19 RX ORDER — METOCLOPRAMIDE 10 MG/1
10 TABLET ORAL ONCE
Status: DISCONTINUED | OUTPATIENT
Start: 2020-06-19 | End: 2020-06-19

## 2020-06-19 RX ORDER — HYDROCODONE BITARTRATE AND ACETAMINOPHEN 10; 325 MG/1; MG/1
1 TABLET ORAL EVERY 6 HOURS PRN
Status: ON HOLD | COMMUNITY
End: 2020-06-22

## 2020-06-19 NOTE — TELEPHONE ENCOUNTER
Spoke to patient, let her know we received Cardiac Clearance and latest EKG. Per David Gallegos NP patient is clear for surgery. Cardiac clearance and EKG sent to preop at [address] [place] [address] to be scanned in to chart.

## 2020-06-19 NOTE — DISCHARGE SUMMARY
Ortho Discharge Summary     Patient ID:  Rose Marie Naik Holstein  [id number]  61year old  10/26/1960      Admit Date: 6/22/2020  7:42 AM  Discharge Date and Time: 6/25/2020  5:37 PM    Attending Physician: Rock Najera MD     Reason for admission: right hip D

## 2020-06-22 ENCOUNTER — HOSPITAL ENCOUNTER (INPATIENT)
Facility: HOSPITAL | Age: 60
LOS: 3 days | Discharge: NURSING, REHAB OR LONG-TERM CARE | DRG: 470 | End: 2020-06-25
Attending: ORTHOPAEDIC SURGERY | Admitting: ORTHOPAEDIC SURGERY
Payer: MEDICARE

## 2020-06-22 ENCOUNTER — ANESTHESIA EVENT (OUTPATIENT)
Dept: SURGERY | Facility: HOSPITAL | Age: 60
DRG: 470 | End: 2020-06-22
Payer: MEDICARE

## 2020-06-22 ENCOUNTER — ANESTHESIA (OUTPATIENT)
Dept: SURGERY | Facility: HOSPITAL | Age: 60
DRG: 470 | End: 2020-06-22
Payer: MEDICARE

## 2020-06-22 ENCOUNTER — APPOINTMENT (OUTPATIENT)
Dept: GENERAL RADIOLOGY | Facility: HOSPITAL | Age: 60
DRG: 470 | End: 2020-06-22
Attending: PHYSICIAN ASSISTANT
Payer: MEDICARE

## 2020-06-22 PROBLEM — Z96.641 S/P HIP REPLACEMENT, RIGHT: Status: ACTIVE | Noted: 2020-06-22

## 2020-06-22 PROCEDURE — 97162 PT EVAL MOD COMPLEX 30 MIN: CPT

## 2020-06-22 PROCEDURE — 94002 VENT MGMT INPAT INIT DAY: CPT

## 2020-06-22 PROCEDURE — 36415 COLL VENOUS BLD VENIPUNCTURE: CPT | Performed by: ORTHOPAEDIC SURGERY

## 2020-06-22 PROCEDURE — 82962 GLUCOSE BLOOD TEST: CPT

## 2020-06-22 PROCEDURE — 85610 PROTHROMBIN TIME: CPT | Performed by: ORTHOPAEDIC SURGERY

## 2020-06-22 PROCEDURE — 88311 DECALCIFY TISSUE: CPT | Performed by: ORTHOPAEDIC SURGERY

## 2020-06-22 PROCEDURE — 88305 TISSUE EXAM BY PATHOLOGIST: CPT | Performed by: ORTHOPAEDIC SURGERY

## 2020-06-22 PROCEDURE — 0SR90JA REPLACEMENT OF RIGHT HIP JOINT WITH SYNTHETIC SUBSTITUTE, UNCEMENTED, OPEN APPROACH: ICD-10-PCS | Performed by: ORTHOPAEDIC SURGERY

## 2020-06-22 PROCEDURE — 73502 X-RAY EXAM HIP UNI 2-3 VIEWS: CPT | Performed by: PHYSICIAN ASSISTANT

## 2020-06-22 PROCEDURE — 97530 THERAPEUTIC ACTIVITIES: CPT

## 2020-06-22 DEVICE — IMPLANTABLE DEVICE: Type: IMPLANTABLE DEVICE | Site: HIP | Status: FUNCTIONAL

## 2020-06-22 DEVICE — HEAD FEM NEUT 40MM HIP BIO: Type: IMPLANTABLE DEVICE | Site: HIP | Status: FUNCTIONAL

## 2020-06-22 DEVICE — SCREW BN 6.5MM 15MM CANC TI: Type: IMPLANTABLE DEVICE | Site: HIP | Status: FUNCTIONAL

## 2020-06-22 DEVICE — STEM FEM LAT OFF STRL HIP P2: Type: IMPLANTABLE DEVICE | Site: HIP | Status: FUNCTIONAL

## 2020-06-22 DEVICE — SHELL ACET HEMI 54MM STRL HIP: Type: IMPLANTABLE DEVICE | Site: HIP | Status: FUNCTIONAL

## 2020-06-22 DEVICE — LINER ACET MP8 NEUT 40MM NLTX: Type: IMPLANTABLE DEVICE | Site: HIP | Status: FUNCTIONAL

## 2020-06-22 RX ORDER — MORPHINE SULFATE 4 MG/ML
2 INJECTION, SOLUTION INTRAMUSCULAR; INTRAVENOUS EVERY 10 MIN PRN
Status: DISCONTINUED | OUTPATIENT
Start: 2020-06-22 | End: 2020-06-22 | Stop reason: HOSPADM

## 2020-06-22 RX ORDER — HYDROMORPHONE HYDROCHLORIDE 1 MG/ML
0.4 INJECTION, SOLUTION INTRAMUSCULAR; INTRAVENOUS; SUBCUTANEOUS EVERY 2 HOUR PRN
Status: DISCONTINUED | OUTPATIENT
Start: 2020-06-22 | End: 2020-06-25

## 2020-06-22 RX ORDER — ROCURONIUM BROMIDE 10 MG/ML
INJECTION, SOLUTION INTRAVENOUS AS NEEDED
Status: DISCONTINUED | OUTPATIENT
Start: 2020-06-22 | End: 2020-06-22 | Stop reason: SURG

## 2020-06-22 RX ORDER — METOCLOPRAMIDE HYDROCHLORIDE 5 MG/ML
10 INJECTION INTRAMUSCULAR; INTRAVENOUS EVERY 6 HOURS PRN
Status: ACTIVE | OUTPATIENT
Start: 2020-06-22 | End: 2020-06-24

## 2020-06-22 RX ORDER — HYDROMORPHONE HYDROCHLORIDE 1 MG/ML
0.4 INJECTION, SOLUTION INTRAMUSCULAR; INTRAVENOUS; SUBCUTANEOUS EVERY 5 MIN PRN
Status: DISCONTINUED | OUTPATIENT
Start: 2020-06-22 | End: 2020-06-22 | Stop reason: HOSPADM

## 2020-06-22 RX ORDER — METOPROLOL TARTRATE 50 MG/1
50 TABLET, FILM COATED ORAL
Status: DISCONTINUED | OUTPATIENT
Start: 2020-06-22 | End: 2020-06-25

## 2020-06-22 RX ORDER — HYDROMORPHONE HYDROCHLORIDE 1 MG/ML
0.2 INJECTION, SOLUTION INTRAMUSCULAR; INTRAVENOUS; SUBCUTANEOUS EVERY 5 MIN PRN
Status: DISCONTINUED | OUTPATIENT
Start: 2020-06-22 | End: 2020-06-22 | Stop reason: HOSPADM

## 2020-06-22 RX ORDER — WARFARIN SODIUM 5 MG/1
10 TABLET ORAL ONCE
Status: COMPLETED | OUTPATIENT
Start: 2020-06-22 | End: 2020-06-22

## 2020-06-22 RX ORDER — ONDANSETRON 2 MG/ML
4 INJECTION INTRAMUSCULAR; INTRAVENOUS ONCE AS NEEDED
Status: DISCONTINUED | OUTPATIENT
Start: 2020-06-22 | End: 2020-06-22 | Stop reason: HOSPADM

## 2020-06-22 RX ORDER — POLYETHYLENE GLYCOL 3350 17 G/17G
17 POWDER, FOR SOLUTION ORAL DAILY PRN
Status: DISCONTINUED | OUTPATIENT
Start: 2020-06-22 | End: 2020-06-24

## 2020-06-22 RX ORDER — BIOTIN 1 MG
1000 TABLET ORAL DAILY
Status: DISCONTINUED | OUTPATIENT
Start: 2020-06-22 | End: 2020-06-22

## 2020-06-22 RX ORDER — SODIUM CHLORIDE, SODIUM LACTATE, POTASSIUM CHLORIDE, CALCIUM CHLORIDE 600; 310; 30; 20 MG/100ML; MG/100ML; MG/100ML; MG/100ML
INJECTION, SOLUTION INTRAVENOUS CONTINUOUS
Status: DISCONTINUED | OUTPATIENT
Start: 2020-06-22 | End: 2020-06-22 | Stop reason: HOSPADM

## 2020-06-22 RX ORDER — ONDANSETRON 2 MG/ML
4 INJECTION INTRAMUSCULAR; INTRAVENOUS EVERY 4 HOURS PRN
Status: ACTIVE | OUTPATIENT
Start: 2020-06-22 | End: 2020-06-24

## 2020-06-22 RX ORDER — MORPHINE SULFATE 4 MG/ML
4 INJECTION, SOLUTION INTRAMUSCULAR; INTRAVENOUS EVERY 10 MIN PRN
Status: DISCONTINUED | OUTPATIENT
Start: 2020-06-22 | End: 2020-06-22 | Stop reason: HOSPADM

## 2020-06-22 RX ORDER — DEXTROSE MONOHYDRATE 25 G/50ML
50 INJECTION, SOLUTION INTRAVENOUS
Status: DISCONTINUED | OUTPATIENT
Start: 2020-06-22 | End: 2020-06-25

## 2020-06-22 RX ORDER — ACETAMINOPHEN 500 MG
1000 TABLET ORAL ONCE
Status: COMPLETED | OUTPATIENT
Start: 2020-06-22 | End: 2020-06-22

## 2020-06-22 RX ORDER — TRANEXAMIC ACID 10 MG/ML
1000 INJECTION, SOLUTION INTRAVENOUS ONCE
Status: COMPLETED | OUTPATIENT
Start: 2020-06-22 | End: 2020-06-22

## 2020-06-22 RX ORDER — MONTELUKAST SODIUM 10 MG/1
10 TABLET ORAL EVERY EVENING
Status: DISCONTINUED | OUTPATIENT
Start: 2020-06-22 | End: 2020-06-25

## 2020-06-22 RX ORDER — PROCHLORPERAZINE EDISYLATE 5 MG/ML
10 INJECTION INTRAMUSCULAR; INTRAVENOUS EVERY 6 HOURS PRN
Status: ACTIVE | OUTPATIENT
Start: 2020-06-22 | End: 2020-06-24

## 2020-06-22 RX ORDER — PROCHLORPERAZINE EDISYLATE 5 MG/ML
5 INJECTION INTRAMUSCULAR; INTRAVENOUS ONCE AS NEEDED
Status: DISCONTINUED | OUTPATIENT
Start: 2020-06-22 | End: 2020-06-22 | Stop reason: HOSPADM

## 2020-06-22 RX ORDER — DOCUSATE SODIUM 100 MG/1
100 CAPSULE, LIQUID FILLED ORAL 2 TIMES DAILY
Status: DISCONTINUED | OUTPATIENT
Start: 2020-06-22 | End: 2020-06-25

## 2020-06-22 RX ORDER — DEXAMETHASONE SODIUM PHOSPHATE 4 MG/ML
VIAL (ML) INJECTION AS NEEDED
Status: DISCONTINUED | OUTPATIENT
Start: 2020-06-22 | End: 2020-06-22 | Stop reason: SURG

## 2020-06-22 RX ORDER — FAMOTIDINE 20 MG/1
20 TABLET ORAL 2 TIMES DAILY
Status: DISCONTINUED | OUTPATIENT
Start: 2020-06-22 | End: 2020-06-25

## 2020-06-22 RX ORDER — WARFARIN SODIUM 5 MG/1
5 TABLET ORAL ONCE
Status: DISCONTINUED | OUTPATIENT
Start: 2020-06-22 | End: 2020-06-22

## 2020-06-22 RX ORDER — ACETAMINOPHEN 500 MG
1000 TABLET ORAL ONCE
Status: DISCONTINUED | OUTPATIENT
Start: 2020-06-22 | End: 2020-06-22 | Stop reason: HOSPADM

## 2020-06-22 RX ORDER — HYDROMORPHONE HYDROCHLORIDE 1 MG/ML
0.8 INJECTION, SOLUTION INTRAMUSCULAR; INTRAVENOUS; SUBCUTANEOUS EVERY 2 HOUR PRN
Status: DISCONTINUED | OUTPATIENT
Start: 2020-06-22 | End: 2020-06-25

## 2020-06-22 RX ORDER — MORPHINE SULFATE 10 MG/ML
6 INJECTION, SOLUTION INTRAMUSCULAR; INTRAVENOUS EVERY 10 MIN PRN
Status: DISCONTINUED | OUTPATIENT
Start: 2020-06-22 | End: 2020-06-22 | Stop reason: HOSPADM

## 2020-06-22 RX ORDER — OXYCODONE AND ACETAMINOPHEN 10; 325 MG/1; MG/1
1-2 TABLET ORAL EVERY 6 HOURS PRN
Qty: 60 TABLET | Refills: 0 | Status: SHIPPED | OUTPATIENT
Start: 2020-06-22

## 2020-06-22 RX ORDER — MAGNESIUM HYDROXIDE 1200 MG/15ML
LIQUID ORAL CONTINUOUS PRN
Status: COMPLETED | OUTPATIENT
Start: 2020-06-22 | End: 2020-06-22

## 2020-06-22 RX ORDER — BISACODYL 10 MG
10 SUPPOSITORY, RECTAL RECTAL
Status: DISCONTINUED | OUTPATIENT
Start: 2020-06-22 | End: 2020-06-24

## 2020-06-22 RX ORDER — SODIUM CHLORIDE, SODIUM LACTATE, POTASSIUM CHLORIDE, CALCIUM CHLORIDE 600; 310; 30; 20 MG/100ML; MG/100ML; MG/100ML; MG/100ML
INJECTION, SOLUTION INTRAVENOUS CONTINUOUS
Status: DISCONTINUED | OUTPATIENT
Start: 2020-06-22 | End: 2020-06-25

## 2020-06-22 RX ORDER — DIPHENHYDRAMINE HCL 25 MG
25 CAPSULE ORAL EVERY 4 HOURS PRN
Status: DISCONTINUED | OUTPATIENT
Start: 2020-06-22 | End: 2020-06-25

## 2020-06-22 RX ORDER — HYDROCODONE BITARTRATE AND ACETAMINOPHEN 5; 325 MG/1; MG/1
1 TABLET ORAL AS NEEDED
Status: DISCONTINUED | OUTPATIENT
Start: 2020-06-22 | End: 2020-06-22 | Stop reason: HOSPADM

## 2020-06-22 RX ORDER — HALOPERIDOL 5 MG/ML
0.25 INJECTION INTRAMUSCULAR ONCE AS NEEDED
Status: DISCONTINUED | OUTPATIENT
Start: 2020-06-22 | End: 2020-06-22 | Stop reason: HOSPADM

## 2020-06-22 RX ORDER — OXYCODONE HCL 10 MG/1
10 TABLET, FILM COATED, EXTENDED RELEASE ORAL EVERY 12 HOURS
Qty: 20 TABLET | Refills: 0 | Status: SHIPPED | OUTPATIENT
Start: 2020-06-22

## 2020-06-22 RX ORDER — METOPROLOL TARTRATE 5 MG/5ML
2.5 INJECTION INTRAVENOUS ONCE
Status: DISCONTINUED | OUTPATIENT
Start: 2020-06-22 | End: 2020-06-22 | Stop reason: HOSPADM

## 2020-06-22 RX ORDER — FAMOTIDINE 10 MG/ML
20 INJECTION, SOLUTION INTRAVENOUS 2 TIMES DAILY
Status: DISCONTINUED | OUTPATIENT
Start: 2020-06-22 | End: 2020-06-25

## 2020-06-22 RX ORDER — NALOXONE HYDROCHLORIDE 0.4 MG/ML
80 INJECTION, SOLUTION INTRAMUSCULAR; INTRAVENOUS; SUBCUTANEOUS AS NEEDED
Status: DISCONTINUED | OUTPATIENT
Start: 2020-06-22 | End: 2020-06-22 | Stop reason: HOSPADM

## 2020-06-22 RX ORDER — SODIUM PHOSPHATE, DIBASIC AND SODIUM PHOSPHATE, MONOBASIC 7; 19 G/133ML; G/133ML
1 ENEMA RECTAL ONCE AS NEEDED
Status: DISCONTINUED | OUTPATIENT
Start: 2020-06-22 | End: 2020-06-25

## 2020-06-22 RX ORDER — FAMOTIDINE 20 MG/1
20 TABLET ORAL ONCE
Status: COMPLETED | OUTPATIENT
Start: 2020-06-22 | End: 2020-06-22

## 2020-06-22 RX ORDER — HYDROMORPHONE HYDROCHLORIDE 1 MG/ML
0.2 INJECTION, SOLUTION INTRAMUSCULAR; INTRAVENOUS; SUBCUTANEOUS EVERY 2 HOUR PRN
Status: DISCONTINUED | OUTPATIENT
Start: 2020-06-22 | End: 2020-06-25

## 2020-06-22 RX ORDER — OXYCODONE HYDROCHLORIDE 5 MG/1
10 TABLET ORAL ONCE
Status: COMPLETED | OUTPATIENT
Start: 2020-06-22 | End: 2020-06-22

## 2020-06-22 RX ORDER — LIDOCAINE HYDROCHLORIDE 10 MG/ML
INJECTION, SOLUTION EPIDURAL; INFILTRATION; INTRACAUDAL; PERINEURAL AS NEEDED
Status: DISCONTINUED | OUTPATIENT
Start: 2020-06-22 | End: 2020-06-22 | Stop reason: SURG

## 2020-06-22 RX ORDER — HYDROCODONE BITARTRATE AND ACETAMINOPHEN 5; 325 MG/1; MG/1
2 TABLET ORAL AS NEEDED
Status: DISCONTINUED | OUTPATIENT
Start: 2020-06-22 | End: 2020-06-22 | Stop reason: HOSPADM

## 2020-06-22 RX ORDER — TRANEXAMIC ACID 10 MG/ML
INJECTION, SOLUTION INTRAVENOUS AS NEEDED
Status: DISCONTINUED | OUTPATIENT
Start: 2020-06-22 | End: 2020-06-22 | Stop reason: SURG

## 2020-06-22 RX ORDER — ONDANSETRON 4 MG/1
4 TABLET, FILM COATED ORAL EVERY 8 HOURS PRN
Qty: 30 TABLET | Refills: 0 | Status: SHIPPED | OUTPATIENT
Start: 2020-06-22

## 2020-06-22 RX ORDER — CYCLOBENZAPRINE HCL 5 MG
5 TABLET ORAL EVERY 8 HOURS PRN
Status: DISCONTINUED | OUTPATIENT
Start: 2020-06-22 | End: 2020-06-25

## 2020-06-22 RX ORDER — CELECOXIB 200 MG/1
200 CAPSULE ORAL ONCE
Status: COMPLETED | OUTPATIENT
Start: 2020-06-22 | End: 2020-06-22

## 2020-06-22 RX ORDER — SCOLOPAMINE TRANSDERMAL SYSTEM 1 MG/1
1 PATCH, EXTENDED RELEASE TRANSDERMAL ONCE
Status: COMPLETED | OUTPATIENT
Start: 2020-06-22 | End: 2020-06-25

## 2020-06-22 RX ORDER — DIPHENHYDRAMINE HYDROCHLORIDE 50 MG/ML
12.5 INJECTION INTRAMUSCULAR; INTRAVENOUS EVERY 4 HOURS PRN
Status: DISCONTINUED | OUTPATIENT
Start: 2020-06-22 | End: 2020-06-25

## 2020-06-22 RX ORDER — TRAZODONE HYDROCHLORIDE 100 MG/1
100 TABLET ORAL NIGHTLY
Status: DISCONTINUED | OUTPATIENT
Start: 2020-06-22 | End: 2020-06-25

## 2020-06-22 RX ORDER — HYDROMORPHONE HYDROCHLORIDE 1 MG/ML
0.6 INJECTION, SOLUTION INTRAMUSCULAR; INTRAVENOUS; SUBCUTANEOUS EVERY 5 MIN PRN
Status: DISCONTINUED | OUTPATIENT
Start: 2020-06-22 | End: 2020-06-22 | Stop reason: HOSPADM

## 2020-06-22 RX ORDER — ZOLPIDEM TARTRATE 5 MG/1
10 TABLET ORAL NIGHTLY PRN
Status: DISCONTINUED | OUTPATIENT
Start: 2020-06-22 | End: 2020-06-25

## 2020-06-22 RX ORDER — DULOXETIN HYDROCHLORIDE 30 MG/1
60 CAPSULE, DELAYED RELEASE ORAL DAILY
Status: DISCONTINUED | OUTPATIENT
Start: 2020-06-23 | End: 2020-06-25

## 2020-06-22 RX ORDER — DEXTROSE MONOHYDRATE 25 G/50ML
50 INJECTION, SOLUTION INTRAVENOUS
Status: DISCONTINUED | OUTPATIENT
Start: 2020-06-22 | End: 2020-06-22 | Stop reason: HOSPADM

## 2020-06-22 RX ORDER — DOCUSATE SODIUM 100 MG/1
100 CAPSULE, LIQUID FILLED ORAL 2 TIMES DAILY
Qty: 60 CAPSULE | Refills: 2 | Status: SHIPPED | OUTPATIENT
Start: 2020-06-22

## 2020-06-22 RX ORDER — OXYCODONE HCL 10 MG/1
10 TABLET, FILM COATED, EXTENDED RELEASE ORAL EVERY 12 HOURS
Status: DISCONTINUED | OUTPATIENT
Start: 2020-06-22 | End: 2020-06-25

## 2020-06-22 RX ORDER — CEFADROXIL 500 MG/1
500 CAPSULE ORAL 2 TIMES DAILY
Qty: 14 CAPSULE | Refills: 0 | Status: SHIPPED | OUTPATIENT
Start: 2020-06-22 | End: 2020-06-29

## 2020-06-22 RX ORDER — LABETALOL HYDROCHLORIDE 5 MG/ML
INJECTION, SOLUTION INTRAVENOUS AS NEEDED
Status: DISCONTINUED | OUTPATIENT
Start: 2020-06-22 | End: 2020-06-22 | Stop reason: SURG

## 2020-06-22 RX ORDER — OXYCODONE AND ACETAMINOPHEN 10; 325 MG/1; MG/1
1-2 TABLET ORAL EVERY 4 HOURS PRN
Status: DISCONTINUED | OUTPATIENT
Start: 2020-06-22 | End: 2020-06-25

## 2020-06-22 RX ORDER — ONDANSETRON 2 MG/ML
INJECTION INTRAMUSCULAR; INTRAVENOUS AS NEEDED
Status: DISCONTINUED | OUTPATIENT
Start: 2020-06-22 | End: 2020-06-22 | Stop reason: SURG

## 2020-06-22 RX ORDER — HYDRALAZINE HYDROCHLORIDE 20 MG/ML
INJECTION INTRAMUSCULAR; INTRAVENOUS AS NEEDED
Status: DISCONTINUED | OUTPATIENT
Start: 2020-06-22 | End: 2020-06-22 | Stop reason: SURG

## 2020-06-22 RX ORDER — DIPHENHYDRAMINE HYDROCHLORIDE 50 MG/ML
25 INJECTION INTRAMUSCULAR; INTRAVENOUS ONCE AS NEEDED
Status: ACTIVE | OUTPATIENT
Start: 2020-06-22 | End: 2020-06-22

## 2020-06-22 RX ADMIN — ONDANSETRON 4 MG: 2 INJECTION INTRAMUSCULAR; INTRAVENOUS at 10:32:00

## 2020-06-22 RX ADMIN — ROCURONIUM BROMIDE 20 MG: 10 INJECTION, SOLUTION INTRAVENOUS at 10:32:00

## 2020-06-22 RX ADMIN — ROCURONIUM BROMIDE 50 MG: 10 INJECTION, SOLUTION INTRAVENOUS at 10:10:00

## 2020-06-22 RX ADMIN — DEXAMETHASONE SODIUM PHOSPHATE 8 MG: 4 MG/ML VIAL (ML) INJECTION at 10:32:00

## 2020-06-22 RX ADMIN — LIDOCAINE HYDROCHLORIDE 50 MG: 10 INJECTION, SOLUTION EPIDURAL; INFILTRATION; INTRACAUDAL; PERINEURAL at 09:54:00

## 2020-06-22 RX ADMIN — LABETALOL HYDROCHLORIDE 10 MG: 5 INJECTION, SOLUTION INTRAVENOUS at 10:13:00

## 2020-06-22 RX ADMIN — TRANEXAMIC ACID 1000 MG: 10 INJECTION, SOLUTION INTRAVENOUS at 10:19:00

## 2020-06-22 RX ADMIN — HYDRALAZINE HYDROCHLORIDE 20 MG: 20 INJECTION INTRAMUSCULAR; INTRAVENOUS at 10:42:00

## 2020-06-22 RX ADMIN — SODIUM CHLORIDE, SODIUM LACTATE, POTASSIUM CHLORIDE, CALCIUM CHLORIDE: 600; 310; 30; 20 INJECTION, SOLUTION INTRAVENOUS at 11:27:00

## 2020-06-22 NOTE — ANESTHESIA PREPROCEDURE EVALUATION
Anesthesia PreOp Note    HPI:     Ashlee Mishratein is a 61year old female who presents for preoperative consultation requested by: Tami Boggs MD    Date of Surgery: 6/22/2020    Procedure(s):  HIP TOTAL REPLACEMENT  Indication: right hip degenerative Essential hypertension    • High blood pressure    • High cholesterol    • Hyperlipidemia    • Hypogammaglobulinemia (HCC)    • Osteoarthritis    • Palpitations    • Pneumonia     02/20   • Pulmonary embolism ([facility] 2017    r/t picc line also clot right arm 50mg in AM and HS , Disp: , Rfl: , 6/22/2020 at 530  DULoxetine HCl 60 MG Oral Cap DR Particles, Take 60 mg by mouth daily. , Disp: , Rfl: , 6/22/2020 at 530  OXYGEN-HELIUM IN, Inhale 2 L into the lungs nightly., Disp: , Rfl: , Taking  OXYGEN-HELIUM IN, Inh Intravenous, Once, Madeline Delvalle MD  scopolamine (TRANSDERM-SCOP) patch, 1 patch, Transdermal, Once, Madeline Delvalle MD, 1 patch at 06/22/20 0859  clonidine/epinephrine/ropivacaine/ketorolac (CERTS) pain cocktail, , Intra-articular, Once (Intra-Op), Sporer Tobacco Use      Smoking status: Former Smoker        Packs/day: 1.00        Years: 20.00        Pack years: 21        Quit date: 2011        Years since quittin.0      Smokeless tobacco: Former User    Substance and Sexual Activity      Alcohol u saturation is 95%.     06/19/20  1243 06/22/20  0841   BP:  110/65   Pulse:  54   Resp:  18   Temp:  98.8 °F (37.1 °C)   TempSrc:  Oral   SpO2:  95%   Weight: 123.8 kg (273 lb) (!) 140.6 kg (310 lb)   Height: 1.778 m (5' 10\")         Anesthesia Evaluation

## 2020-06-22 NOTE — INTERVAL H&P NOTE
Pre-op Diagnosis: right hip degenerative joint disease    The above referenced H&P was reviewed by Krystal Rubio PA-C on 6/22/2020, the patient was examined and no significant changes have occurred in the patient's condition since the H&P was performed.   I

## 2020-06-22 NOTE — CM/SW NOTE
CM received MD order for discharge Planning. CM to discuss discharge planning with pt. CM met with pt at bedside to complete initial assessment. CM confirmed pt's address. Pt lives in a 1 level house with spouse/ ex is now her roomate.  There is/are 1 steps

## 2020-06-22 NOTE — ANESTHESIA POSTPROCEDURE EVALUATION
Patient: Rosena Quest Holstein    Procedure Summary     Date:  06/22/20 Room / Location:  [address] [place] [address] MAIN OR 04 / [address] [facility] MAIN OR    Anesthesia Start:  0251 Anesthesia Stop:      Procedure:  HIP TOTAL REPLACEMENT (Right Hip) Diagnosis:  (right hip degenerative joint dise

## 2020-06-22 NOTE — PHYSICAL THERAPY NOTE
PHYSICAL THERAPY HIP EVALUATION - INPATIENT     Room Number: 428/428-A  Evaluation Date: 6/22/2020  Type of Evaluation: Initial  Physician Order: PT Eval and Treat    Presenting Problem: OA right hip , pt is s/p right posterior MICHELLE .  pt is WBAT post sx for using raised HOB supine to sit emphasis on maintaining hip precautions and sequencing. Pt required 2 person max assist to scoot toward EOB . Pt with poor tolerance for sitting at EOB reporting increase pain and fearful of activity.    Pt required extra mumtaz indep ADLS with adaptive equipment . Pt does not drive . Therapy is recommending ROHAN as next level of care. Therapy provided d/c recommendation for ROHAN to RN and did talk with RN d/c planner about session above and pt need for rehab.      D/c plans pen respiratory status, hemodynamics, GI status, reviewing labs, and assisting with anticoagulation monitoring as patient appropriate.              Purvi Mariscal 3, APN  Office [phone]  [facility] [phone]  6/22/2020                    Problem List  Pr OTHER      IVC filter 1/15/2020   • TOTAL HIP REPLACEMENT Left 01/2020   • TOTAL KNEE REPLACEMENT Bilateral        HOME SITUATION  Type of Home: House   Home Layout: One level  Stairs to Enter : 1  Railing: No          Lives With: (ex  who works , p Standing: Not tested    ADDITIONAL TESTS         pt reports intact sensation                         ACTIVITY TOLERANCE      resting /82  HR 71 O2 sats  99     End of session HR 64 O2 sats 93 % RA    Therapy did return pt to O2              AM-PAC '6 verbalizes and/or demonstrates all precautions and safety concerns independently   Goal #5   Current Status    Goal #6 Patient independently performs home exercise program for ROM/strengthening per the instructions provided in preparation for discharge.

## 2020-06-22 NOTE — ANESTHESIA PROCEDURE NOTES
Airway  Date/Time: 6/22/2020 10:12 AM  Urgency: Elective    Airway not difficult    General Information and Staff    Patient location during procedure: OR  Anesthesiologist: Gabriella Shukla MD  Performed: anesthesiologist     Indications and Patient Coletta Primus

## 2020-06-22 NOTE — PROGRESS NOTES
[facility]    Ortho Medical Progress Note     Alphonse Mu Holstein Patient Status:  Outpatient in a Bed    10/26/1960 MRN [id number]   Location [address] [facility] Attending Eunice Gregorio MD   Hosp Day # 0 PCP [facility] twice weekly and then 7.5 mg daily  GI Prophylaxis -  Home medications reconciled     The patient was discussed with Dr. Nikki Felty.   We are following for medical assessment by monitoring respiratory status, hemodynamics, GI status, reviewing labs, and assisting

## 2020-06-23 PROCEDURE — 97530 THERAPEUTIC ACTIVITIES: CPT

## 2020-06-23 PROCEDURE — 85014 HEMATOCRIT: CPT | Performed by: NURSE PRACTITIONER

## 2020-06-23 PROCEDURE — 97116 GAIT TRAINING THERAPY: CPT

## 2020-06-23 PROCEDURE — 85610 PROTHROMBIN TIME: CPT | Performed by: PHYSICIAN ASSISTANT

## 2020-06-23 PROCEDURE — 82962 GLUCOSE BLOOD TEST: CPT

## 2020-06-23 PROCEDURE — 97166 OT EVAL MOD COMPLEX 45 MIN: CPT

## 2020-06-23 PROCEDURE — 97535 SELF CARE MNGMENT TRAINING: CPT

## 2020-06-23 PROCEDURE — 80053 COMPREHEN METABOLIC PANEL: CPT | Performed by: NURSE PRACTITIONER

## 2020-06-23 PROCEDURE — 85018 HEMOGLOBIN: CPT | Performed by: NURSE PRACTITIONER

## 2020-06-23 PROCEDURE — 97110 THERAPEUTIC EXERCISES: CPT

## 2020-06-23 RX ORDER — WARFARIN SODIUM 5 MG/1
10 TABLET ORAL NIGHTLY
Status: DISCONTINUED | OUTPATIENT
Start: 2020-06-23 | End: 2020-06-25

## 2020-06-23 NOTE — PLAN OF CARE
Taking Percocet prn pain. Up with two assist and walker. Instructed on total hip precautions as well as handwashing and safety issues. Plan is to go to rehab when discharged.   Problem: Patient Centered Care  Goal: Patient preferences are identified and i non-pharmacological measures as appropriate and evaluate response  - Consider cultural and social influences on pain and pain management  - Manage/alleviate anxiety  - Utilize distraction and/or relaxation techniques  - Monitor for opioid side effects  - N Refer to Case Management Department for coordinating discharge planning if the patient needs post-hospital services based on physician/LIP order or complex needs related to functional status, cognitive ability or social support system  Outcome: Progressing

## 2020-06-23 NOTE — PROGRESS NOTES
[facility]    Progress Note    Marta Notch Holstein Patient Status:  Inpatient    10/26/1960 MRN [id number]   Location [facility] 4W/SW/SE Attending Crispin Dueñas MD   Hosp Day # 1 PCP Mohini Brooks       Subjective:   Iraida Trevino acetabulum. * No obvious complication. Dictated by (CST): Ayush Barlow MD on 6/22/2020 at 1:29 PM     Finalized by (CST):  Ayush Barlow MD on 6/22/2020 at 1:29 PM                        Yadira Sanders MD  6/23/2020

## 2020-06-23 NOTE — PHYSICAL THERAPY NOTE
PHYSICAL THERAPY HIP TREATMENT NOTE - INPATIENT    Room Number: 428/428-A            Presenting Problem: OA right hip , pt is s/p right posterior MICHELLE . pt is WBAT post sx for right LE . pt with posterior hip precautions post sx .   PMH sign for Left Posteri rehabilitation    PLAN  PT Treatment Plan: Endurance;Gait training;Transfer training;Balance training    SUBJECTIVE  I am in pain but will try     OBJECTIVE  Precautions: MICHELLE - posterior    WEIGHT BEARING RESTRICTION  Weight Bearing Restriction: R lower ex Sets  reps  reps   Hip Abd/Add  reps  reps   Heel slides 10 reps  reps   Saq  reps  reps   Sitting Knee Extension 10 reps 15 reps   Standing heel/toe raises  reps  reps   Hamstring Curls  reps  reps   Forward, back steps  reps  reps   Short Squats  reps  r

## 2020-06-23 NOTE — PLAN OF CARE
Pt states pain is well controlled with oral and iv pain meds. Pt remains sba max assist in all cares and is reinforced to call for all needs. Pt is drinking and voiding well. Pt current vss. Pt has fall and ortho hip precautions in place.  Rn is monitoring

## 2020-06-23 NOTE — OCCUPATIONAL THERAPY NOTE
OCCUPATIONAL THERAPY EVALUATION - INPATIENT     Room Number: 428/428-A  Evaluation Date: 6/23/2020  Type of Evaluation: Initial  Presenting Problem: (s/p R MICHELLE; posterior  )    Physician Order: IP Consult to Occupational Therapy  Reason for Therapy: ADL/IA patients with this level of impairment may benefit from subacute rehab.      DISCHARGE RECOMMENDATIONS  OT Discharge Recommendations: Sub-acute rehabilitation;24 hour care/supervision  OT Device Recommendations: (Hip kit except has reachers )    PLAN  OT Tr impairment     glasses       Past Surgical History  Past Surgical History:   Procedure Laterality Date   • COLONOSCOPY     • EYE SURGERY Left    • HERNIA SURGERY      umbilical   • HIP TOTAL REPLACEMENT Left 1/27/2020    Performed by Kaz Warner MD at E sleeping at night at times. It does go away with movement of LUE. Pt did not currently have numbness in L hand.      Communication: Makes needs known    Behavioral/Emotional/Social: Pt was cooperative     RANGE OF MOTION   Upper extremity ROM is within func Patient will complete functional transfer with CGA w/RW and adhere to posterior hip precautions. Comment:     Patient will complete toileting with min. A while adhering to posterior hip precautions.    Comment:     Patient will tolerate standing for 4 mi

## 2020-06-23 NOTE — PROGRESS NOTES
[facility] - [facility]    Ortho Medical Progress Note     hSimon Chong Holstein Patient Status:  Inpatient    10/26/1960 MRN [id number]   Location [facility] 4W/SW/SE Attending Eleanor Saldivar,  [facility] Day # 1 PCP Nando Vázquez 06/23/2020    BUN 14 06/23/2020     06/23/2020    K 4.3 06/23/2020     06/23/2020    CO2 30.0 06/23/2020     (H) 06/23/2020    CA 8.7 06/23/2020    ALB 3.1 (L) 06/23/2020    ALKPHO 74 06/23/2020    BILT 0.5 06/23/2020    TP 6.8 06/23/202

## 2020-06-23 NOTE — OPERATIVE REPORT
[address] [place] [address]  OPERATIVE REPORT  PATIENT NAME: Melissa Alcala  MR#: [id number]  ATTENDING MD: Milvia Cruz  DATE OF PROCEDURE: 6/22/2020  PREOPERATIVE DIAGNOSIS: Degenerative Arthritis Right Hi cementless acetabular and femoral component was able to be placed in stable position. PROCEDURE: The patient was admitted to the same day surgery program on Monday, June 22, 2020.  Following proper identification in the preoperative holding area with tish Coated Hemispherical Shell acetabular component was placed in 45 degrees of verticality and 20 degrees of anteversion. This was seated and gave excellent initial stability. 2 Screws were used. Subsequently, a 54/40 polyethylene liner was inserted.   Gricelda morbid obesity the surgical procedure was longer then our standard procedure. Special retractors were required for surgery and the surgical difficulty was tremendously increased due to the patients' morbid obesity.  Additional surgical drapes were required

## 2020-06-24 PROCEDURE — 82962 GLUCOSE BLOOD TEST: CPT

## 2020-06-24 PROCEDURE — 97530 THERAPEUTIC ACTIVITIES: CPT

## 2020-06-24 PROCEDURE — 85610 PROTHROMBIN TIME: CPT | Performed by: PHYSICIAN ASSISTANT

## 2020-06-24 PROCEDURE — 97116 GAIT TRAINING THERAPY: CPT

## 2020-06-24 PROCEDURE — 97110 THERAPEUTIC EXERCISES: CPT

## 2020-06-24 RX ORDER — BISACODYL 10 MG
10 SUPPOSITORY, RECTAL RECTAL
Status: DISCONTINUED | OUTPATIENT
Start: 2020-06-24 | End: 2020-06-25

## 2020-06-24 RX ORDER — POLYETHYLENE GLYCOL 3350 17 G/17G
17 POWDER, FOR SOLUTION ORAL 2 TIMES DAILY
Status: DISCONTINUED | OUTPATIENT
Start: 2020-06-24 | End: 2020-06-25

## 2020-06-24 RX ORDER — SENNOSIDES 8.6 MG
17.2 TABLET ORAL 2 TIMES DAILY
Status: DISCONTINUED | OUTPATIENT
Start: 2020-06-24 | End: 2020-06-25

## 2020-06-24 NOTE — PLAN OF CARE
Pt states pain is controlled with oral pain meds and iv pain meds. Pt remains sba in all cares and is reinforced to call for all needs. Pt is drinking and voiding well. Pt current vss. Pt has fall precautions in place and ortho hip precautions in place.  Rn appropriate pain scale  - Administer analgesics based on type and severity of pain and evaluate response  - Implement non-pharmacological measures as appropriate and evaluate response  - Consider cultural and social influences on pain and pain management Complete POLST form as appropriate  - Assess patient's ability to be responsible for managing their own health  - Refer to Case Management Department for coordinating discharge planning if the patient needs post-hospital services based on physician/LIP ord

## 2020-06-24 NOTE — PLAN OF CARE
Pt up to chair and ambulated with PT. Accuchecks no need for coverage, pain controlled with Percocet per MAR. Pt refusing laxatives, giving colace and prune juice.    Problem: Patient Centered Care  Goal: Patient preferences are identified and integrated in effects  - Notify MD/LIP if interventions unsuccessful or patient reports new pain  - Anticipate increased pain with activity and pre-medicate as appropriate  Outcome: Progressing     Problem: RISK FOR INFECTION - ADULT  Goal: Absence of fever/infection du Progressing

## 2020-06-24 NOTE — PHYSICAL THERAPY NOTE
PHYSICAL THERAPY HIP TREATMENT NOTE - INPATIENT     Room Number: 428/428-A       Presenting Problem: OA right hip , pt is s/p right posterior MICHELLE . pt is WBAT post sx for right LE . pt with posterior hip precautions post sx .   PMH sign for Left Posterior T as Tolerated       PAIN ASSESSMENT   Ratin  Location: rt hip   Management Techniques:  Activity promotion;Repositioning    BALANCE                                                                                                                     Static Status Mod A    Goal #2 Patient is able to demonstrate transfers Sit to/from Stand at assistance level:  Min assist of one with BRW      Goal #2  Current Status Mod A    Goal #3 Patient is able to ambulate 10-20  feet with assistive device at assistance le

## 2020-06-24 NOTE — PROGRESS NOTES
[facility]    Ortho Medical Progress Note     Ulysses Keas Holstein Patient Status:  Inpatient    10/26/1960 MRN [id number]   Location [facility] 4W/SW/SE Attending Ozzie Olivo, [address] [facility] Day # 2 PCP Batsheva Minors Date    WBC 8.0 06/09/2020    HGB 10.7 (L) 06/23/2020    HCT 34.1 (L) 06/23/2020    .0 06/09/2020    CREATSERUM 0.68 06/23/2020    BUN 14 06/23/2020     06/23/2020    K 4.3 06/23/2020     06/23/2020    CO2 30.0 06/23/2020     (H)

## 2020-06-25 VITALS
RESPIRATION RATE: 18 BRPM | DIASTOLIC BLOOD PRESSURE: 60 MMHG | SYSTOLIC BLOOD PRESSURE: 127 MMHG | TEMPERATURE: 99 F | HEIGHT: 70 IN | OXYGEN SATURATION: 94 % | HEART RATE: 69 BPM | BODY MASS INDEX: 41.95 KG/M2 | WEIGHT: 293 LBS

## 2020-06-25 PROCEDURE — 82962 GLUCOSE BLOOD TEST: CPT

## 2020-06-25 PROCEDURE — 97530 THERAPEUTIC ACTIVITIES: CPT

## 2020-06-25 PROCEDURE — 85610 PROTHROMBIN TIME: CPT | Performed by: PHYSICIAN ASSISTANT

## 2020-06-25 PROCEDURE — 97110 THERAPEUTIC EXERCISES: CPT

## 2020-06-25 PROCEDURE — 97116 GAIT TRAINING THERAPY: CPT

## 2020-06-25 NOTE — CM/SW NOTE
SW confirmed with RN that pt is medically ready for discharge today. [facility] called and spoke with Wisam Edmonds from [facility] to arrange a time for discharge. [facility] can accept the pt today at [facility].     RN is aware of discharge time and location and will inform family.

## 2020-06-25 NOTE — PLAN OF CARE
Comments: Pt alert, calling appropriately for assistance. Wearing CPAP @HS. PRN Percocet for pain management. OOB x1a/walker. Accucheck AC/HS. Coumadin for DVT prophx. Aquacell is c/d/I. N.O. Senna and miralax added @ HS, LBM 6/18.  Passing gas ok, per pt r techniques  - Monitor for opioid side effects  - Notify MD/LIP if interventions unsuccessful or patient reports new pain  - Anticipate increased pain with activity and pre-medicate as appropriate  Outcome: Progressing     Problem: RISK FOR INFECTION - ADUL or social support system  Outcome: Progressing

## 2020-06-25 NOTE — PLAN OF CARE
Problem: Patient Centered Care  Goal: Patient preferences are identified and integrated in the patient's plan of care  Description  Interventions:  - What would you like us to know as we care for you?   - Provide timely, complete, and accurate informatio and evaluate response  - Consider cultural and social influences on pain and pain management  - Manage/alleviate anxiety  - Utilize distraction and/or relaxation techniques  - Monitor for opioid side effects  - Notify MD/LIP if interventions unsuccessful o (meds, wound care, etc)  - Arrange for interpreters to assist at discharge as needed  - Consider post-discharge preferences of patient/family/discharge partner  - Complete POLST form as appropriate  - Assess patient's ability to be responsible for managing

## 2020-06-25 NOTE — PHYSICAL THERAPY NOTE
PHYSICAL THERAPY HIP TREATMENT NOTE - INPATIENT     Room Number: 428/428-A       Presenting Problem: OA right hip , pt is s/p right posterior MICHELLE . pt is WBAT post sx for right LE . pt with posterior hip precautions post sx .   PMH sign for Left Posterior T Ratin  Location: rt hip  Management Techniques:  Activity promotion;Repositioning    BALANCE                                                                                                                     Static Sitting: Good  Dynamic Sitting: Goo Min A    Goal #2 Patient is able to demonstrate transfers Sit to/from Stand at assistance level:  Min assist of one with BRW      Goal #2  Current Status Min A    Goal #3 Patient is able to ambulate 10-20  feet with assistive device at assistance level: mi

## 2020-06-25 NOTE — PROGRESS NOTES
[facility]    Ortho Medical Progress Note     Shimon Chong Holstein Patient Status:  Inpatient    10/26/1960 MRN [id number]   Location [facility] 4W/SW/SE Attending Eleanor Saldivar,  [facility] Day # 3 PCP Nando Vázquez CREATSERUM 0.68 06/23/2020    BUN 14 06/23/2020     06/23/2020    K 4.3 06/23/2020     06/23/2020    CO2 30.0 06/23/2020     (H) 06/23/2020    CA 8.7 06/23/2020    ALB 3.1 (L) 06/23/2020    ALKPHO 74 06/23/2020    BILT 0.5 06/23/2020

## 2021-04-10 DIAGNOSIS — Z23 NEED FOR VACCINATION: ICD-10-CM

## 2022-10-31 DIAGNOSIS — M54.12 CERVICAL RADICULOPATHY: Primary | ICD-10-CM

## 2022-10-31 DIAGNOSIS — M54.16 LUMBAR RADICULOPATHY: ICD-10-CM

## 2022-11-21 ENCOUNTER — HOSPITAL ENCOUNTER (OUTPATIENT)
Dept: REHABILITATION | Age: 62
Discharge: STILL ADMITTED OR ADMINISTRATIVE | End: 2022-11-21

## 2022-11-21 PROCEDURE — 97113 AQUATIC THERAPY/EXERCISES: CPT

## 2022-11-21 PROCEDURE — 97162 PT EVAL MOD COMPLEX 30 MIN: CPT

## 2022-11-21 ASSESSMENT — ENCOUNTER SYMPTOMS
ALLEVIATING FACTORS: REST
PAIN SEVERITY NOW: 8
ALLEVIATING FACTORS: PRESCRIPTION MEDICATIONS
QUALITY: NUMBNESS
ALLEVIATING FACTORS: HEAT
QUALITY: PRESSURE
QUALITY: STIFF
QUALITY: SORE
SUBJECTIVE PAIN PROGRESSION: NO CHANGE
QUALITY: PINS AND NEEDLES
QUALITY: ACHE
QUALITY: SHARP

## 2022-11-28 ENCOUNTER — APPOINTMENT (OUTPATIENT)
Dept: REHABILITATION | Age: 62
End: 2022-11-28

## 2022-12-02 ENCOUNTER — APPOINTMENT (OUTPATIENT)
Dept: REHABILITATION | Age: 62
End: 2022-12-02

## 2022-12-07 ENCOUNTER — APPOINTMENT (OUTPATIENT)
Dept: REHABILITATION | Age: 62
End: 2022-12-07

## 2024-02-07 ENCOUNTER — V-VISIT (OUTPATIENT)
Dept: URGENT CARE | Age: 64
End: 2024-02-07

## 2024-02-07 VITALS
OXYGEN SATURATION: 94 % | DIASTOLIC BLOOD PRESSURE: 73 MMHG | TEMPERATURE: 98 F | HEART RATE: 57 BPM | SYSTOLIC BLOOD PRESSURE: 122 MMHG | RESPIRATION RATE: 20 BRPM

## 2024-02-07 DIAGNOSIS — R05.1 ACUTE COUGH: ICD-10-CM

## 2024-02-07 DIAGNOSIS — H66.92 LEFT ACUTE OTITIS MEDIA: Primary | ICD-10-CM

## 2024-02-07 LAB
FLUAV AG UPPER RESP QL IA.RAPID: NEGATIVE
FLUBV AG UPPER RESP QL IA.RAPID: NEGATIVE
SARS-COV+SARS-COV-2 AG RESP QL IA.RAPID: NOT DETECTED

## 2024-02-07 RX ORDER — LOSARTAN POTASSIUM 25 MG/1
25 TABLET ORAL DAILY
COMMUNITY

## 2024-02-07 RX ORDER — WARFARIN SODIUM 5 MG/1
TABLET ORAL
COMMUNITY
Start: 2023-12-27

## 2024-02-07 RX ORDER — HYDROCODONE BITARTRATE AND ACETAMINOPHEN 10; 325 MG/1; MG/1
TABLET ORAL
COMMUNITY
Start: 2024-01-26 | End: 2024-02-07 | Stop reason: ALTCHOICE

## 2024-02-07 RX ORDER — MELATONIN/PYRIDOXINE HCL (B6) 5 MG-10 MG
3 TABLET,IMMED, EXTENDED RELEASE, BIPHASIC ORAL DAILY
COMMUNITY
End: 2024-02-07 | Stop reason: ALTCHOICE

## 2024-02-07 RX ORDER — BUDESONIDE AND FORMOTEROL FUMARATE DIHYDRATE 160; 4.5 UG/1; UG/1
2 AEROSOL RESPIRATORY (INHALATION) 2 TIMES DAILY
COMMUNITY

## 2024-02-07 RX ORDER — DIPHENHYDRAMINE HCL 25 MG
3 TABLET ORAL
COMMUNITY

## 2024-02-07 RX ORDER — METOPROLOL TARTRATE 50 MG/1
75 TABLET, FILM COATED ORAL
COMMUNITY

## 2024-02-07 RX ORDER — ROSUVASTATIN CALCIUM 5 MG/1
5 TABLET, COATED ORAL NIGHTLY
COMMUNITY
Start: 2023-10-31

## 2024-02-07 RX ORDER — WARFARIN SODIUM 7.5 MG/1
7.5 TABLET ORAL
COMMUNITY

## 2024-02-07 RX ORDER — DULOXETIN HYDROCHLORIDE 60 MG/1
60 CAPSULE, DELAYED RELEASE ORAL DAILY
COMMUNITY

## 2024-02-07 RX ORDER — FUROSEMIDE 20 MG/1
TABLET ORAL
COMMUNITY

## 2024-02-07 RX ORDER — AMOXICILLIN 875 MG/1
875 TABLET, COATED ORAL 2 TIMES DAILY
Qty: 20 TABLET | Refills: 0 | Status: SHIPPED | OUTPATIENT
Start: 2024-02-07 | End: 2024-02-17

## 2024-02-07 RX ORDER — METOPROLOL SUCCINATE 25 MG/1
TABLET, EXTENDED RELEASE ORAL
COMMUNITY
Start: 2023-12-30

## 2024-02-07 RX ORDER — DULOXETIN HYDROCHLORIDE 30 MG/1
CAPSULE, DELAYED RELEASE ORAL
COMMUNITY
Start: 2023-12-21

## 2024-02-07 RX ORDER — CEPHALEXIN 500 MG/1
CAPSULE ORAL
COMMUNITY
Start: 2023-08-22 | End: 2024-02-07 | Stop reason: ALTCHOICE

## 2024-02-07 RX ORDER — TRAZODONE HYDROCHLORIDE 100 MG/1
TABLET ORAL
COMMUNITY
Start: 2023-12-27

## 2024-02-07 RX ORDER — ALBUTEROL SULFATE 2.5 MG/3ML
SOLUTION RESPIRATORY (INHALATION)
COMMUNITY
Start: 2024-01-10

## 2024-02-07 RX ORDER — ZOLPIDEM TARTRATE 10 MG/1
10 TABLET ORAL NIGHTLY PRN
COMMUNITY

## 2024-02-07 RX ORDER — FLUTICASONE PROPIONATE 50 MCG
2 SPRAY, SUSPENSION (ML) NASAL DAILY
Qty: 16 G | Refills: 0 | Status: SHIPPED | OUTPATIENT
Start: 2024-02-07 | End: 2024-02-17

## 2024-02-07 ASSESSMENT — PAIN SCALES - GENERAL: PAINLEVEL: 4

## (undated) DEVICE — SUTURE MONOCRYL 3-0 Y936H

## (undated) DEVICE — GAMMEX® PI HYBRID SIZE 6.5, STERILE POWDER-FREE SURGICAL GLOVE, POLYISOPRENE AND NEOPRENE BLEND: Brand: GAMMEX

## (undated) DEVICE — GAUZE SPONGES,12 PLY: Brand: CURITY

## (undated) DEVICE — 3M™ STERI-DRAPE™ U-DRAPE 1015: Brand: STERI-DRAPE™

## (undated) DEVICE — GAMMEX® PI HYBRID SIZE 7.5, STERILE POWDER-FREE SURGICAL GLOVE, POLYISOPRENE AND NEOPRENE BLEND: Brand: GAMMEX

## (undated) DEVICE — ANTIBACTERIAL UNDYED BRAIDED (POLYGLACTIN 910), SYNTHETIC ABSORBABLE SUTURE: Brand: COATED VICRYL

## (undated) DEVICE — STANDARD HYPODERMIC NEEDLE,POLYPROPYLENE HUB: Brand: MONOJECT

## (undated) DEVICE — 1010 S-DRAPE TOWEL DRAPE 10/BX: Brand: STERI-DRAPE™

## (undated) DEVICE — BLADE ELECTRODE: Brand: EDGE

## (undated) DEVICE — 3M™ IOBAN™ 2 ANTIMICROBIAL INCISE DRAPE 6650EZ: Brand: IOBAN™ 2

## (undated) DEVICE — T5 HOOD WITH PEEL AWAY FACE SHIELD

## (undated) DEVICE — SOL  .9 3000ML

## (undated) DEVICE — ENCORE® LATEX ACCLAIM SIZE 7.5, STERILE LATEX POWDER-FREE SURGICAL GLOVE: Brand: ENCORE

## (undated) DEVICE — GAMMEX® PI HYBRID SIZE 8, STERILE POWDER-FREE SURGICAL GLOVE, POLYISOPRENE AND NEOPRENE BLEND: Brand: GAMMEX

## (undated) DEVICE — ENCORE® PERRY STYLE 42 PF SIZE 8, STERILE LATEX POWDER-FREE SURGICAL GLOVE: Brand: ENCORE

## (undated) DEVICE — PILLOW FX 56X38X15CM MED HIP

## (undated) DEVICE — ENCORE® PERRY STYLE 42 PF SIZE 7.5, STERILE LATEX POWDER-FREE SURGICAL GLOVE: Brand: ENCORE

## (undated) DEVICE — SOL  .9 1000ML BTL

## (undated) DEVICE — SYRINGE MNJCT 35ML LF STRL LL

## (undated) DEVICE — DRESSING AQUACEL AG SP 3.5X10

## (undated) DEVICE — SPINOCAN® 18 GA. X 3-1/2 IN. (90 MM) SPINAL NEEDLE: Brand: SPINOCAN®

## (undated) DEVICE — DRESSING AQUACEL AG 3.5 X 10

## (undated) DEVICE — PEN: MARKING STD PT 100/CS: Brand: MEDICAL ACTION INDUSTRIES

## (undated) DEVICE — CHLORAPREP 26ML APPLICATOR

## (undated) DEVICE — DRAPE SRG 70X60IN SPLT U IMPRV

## (undated) DEVICE — TOWEL OR BLU 16X26 STRL

## (undated) DEVICE — Device: Brand: STABLECUT®

## (undated) DEVICE — ENCORE® LATEX ACCLAIM SIZE 8, STERILE LATEX POWDER-FREE SURGICAL GLOVE: Brand: ENCORE

## (undated) DEVICE — CONTAINER SPEC STR 4OZ GRY LID

## (undated) DEVICE — BATTERY

## (undated) DEVICE — SHEET,DRAPE,70X100,STERILE: Brand: MEDLINE

## (undated) DEVICE — 60 ML SYRINGE LUER-LOCK TIP: Brand: MONOJECT

## (undated) DEVICE — PILLOW ABDUCTION HIP MED

## (undated) DEVICE — DERMABOND LIQUID ADHESIVE

## (undated) DEVICE — ANTIBACTERIAL VIOLET BRAIDED (POLYGLACTIN 910), SYNTHETIC ABSORBABLE SUTURE: Brand: COATED VICRYL

## (undated) DEVICE — PACK CDS TOTAL HIP

## (undated) DEVICE — HOOD: Brand: FLYTE

## (undated) NOTE — IP AVS SNAPSHOT
Patient Demographics     Address  [address] [place] [address] Phone  [phone] [facility])  [phone] [facility] E-mail Address  [email or web address]      Emergency Contact(s)     Name Relation Home Work [address] [phone]        All 3. How should I take care of my incision and dressing? ? The Aquacel Ag dressing must be removed after 7 days unless saturated before then. Please review instructions on proper removal of the dressing.   ? If dressing becomes saturated before the 7 day mar ? It is common for the knee and hip to be red and warm after surgery  ? Our suspicion for infection rises with any of the following:  o 1. There is pus like discharge from the incision  o 2. Your temperature is over 101 degrees  o 3.   It is painful to mo ? We are happy to provide you with ONE handicap placard at the time of your surgery which will be good for six months. Please ask our office at your pre-op or post-op appointment if you feel you will need a placard.   If you feel you need a handicap placar The Aquacel dressing is to be removed on post-op day 7, unless the dressing is not adhering properly—in which case the dressing is to be changed sooner. To remove the dressing, see instructions below.         Aquacel Removal  Press down on the skin with on Your medication list      TAKE these medications       Instructions Authorizing Provider Morning Afternoon Evening As Needed   B Complex-C-Folic Acid Tabs  Next dose due:  TONIGHT      Take 1 tablet by mouth nightly.           Benadryl Allergy 25 MG Tabs Take 75 mg by mouth 2 (two) times daily. Takes 50mg in AM and HS          Montelukast Sodium 10 MG Tabs  Commonly known as:  SINGULAIR  Next dose due:  TOMORROW MORNING      Take 10 mg by mouth every morning.           Omeprazole 40 MG Cpdr  Next dose due: Take 10 mg by mouth nightly as needed for Sleep.                 Where to Get Your Medications      Please  your prescriptions at the location directed by your doctor or nurse    Bring a paper prescription for each of these medications  Cefadroxil 5 [id number] oxyCODONE-acetaminophen (PERCOCET)  MG per tab 1-2 tablet 06/24/20 1808 Given      [id number] oxyCODONE-acetaminophen (PERCOCET)  MG per tab 1-2 tablet 06/24/20 2213 Given      [id number] oxyCODONE-acetaminophen (PERCOCET)  MG pe Component Value Reference Range Flag Lab   Hold Lt Green Auto Resulted — — [facility])            PROTHROMBIN TIME (PT) [[id number]] (Normal)  Resulted: 06/25/20 0738, Result status: Final result   Ordering provider:  ROYER Rudolph  06/24/20 23 Filed:  6/25/2020  3:58 PM Date of Service:  6/25/2020  3:55 PM Status:  Signed    :  Lupillo Dye PTA (Physical Therapist)       PHYSICAL THERAPY HIP TREATMENT NOTE - INPATIENT     Room Number: 428/428-A       Presenting Problem: OA right hip WEIGHT BEARING RESTRICTION  Weight Bearing Restriction: R lower extremity        R Lower Extremity: Weight Bearing as Tolerated       PAIN ASSESSMENT   Ratin  Location: rt hip  Management Techniques:  Activity promotion;Repositioning    BALANCE Patient Goal Patient's self-stated goal is: rehab    Goal #1 Patient is able to demonstrate supine - sit EOB @ level: mod assist of one    Goal #1   Current Status Min A    Goal #2 Patient is able to demonstrate transfers Sit to/from Stand at assistance le Type 2 diabetes mellitus without complication, without long-term current use of insulin (HCC)    History of pulmonary embolism    Mixed hyperlipidemia    History of hip replacement    S/P hip replacement, right[DK. 2]      PHYSICAL THERAPY ASSESSMENT How much difficulty does the patient currently have. .. [DK.1]  -   Turning over in bed (including adjusting bedclothes, sheets and blankets)?: A Lot   -   Sitting down on and standing up from a chair with arms (e.g., wheelchair, bedside commode, etc.): A Lo Goal #5 Patient verbalizes and/or demonstrates all precautions and safety concerns independently   Goal #5   Current Status In progress   Goal #6 Patient independently performs home exercise program for ROM/strengthening per the instructions provided in pr steps with BRW in both session, improvement in pm noted. Patient was able to transfer with RW to bedside chair in am and rolling chair go bathroom than to bedside chair in pm. Patient was instructed on both kegs exer in sitting and in supine.  Educated on p -   Moving from lying on back to sitting on the side of the bed?: A Lot   How much help from another person does the patient currently need. ..   -   Moving to and from a bed to a chair (including a wheelchair)?: A Lot   -   Need to walk in hospital room?: Current Status In progress   Goal #6 Patient independently performs home exercise program for ROM/strengthening per the instructions provided in preparation for discharge. Goal #6  Current Status In progress[DK. 1]            Attribution Key    DK. 1 - Kur Pt reported pain increase to 9/10 with activity . [KS. 2]      Patient's current functional deficits include[KS. 1] need for 2-3 person assist for fxn mobility this session.  Pt with high fall risk ,generalized weakness throughout and loss of AROM and strength within reach , report to RN , alarm set and returned to O2. See vitals below. [KS.2]        The patient's Approx Degree of Impairment: 92.36% has been calculated based on documentation in the [facility] '6 clicks' Inpatient Basic Mobility Short Form.   Research Dyslipidemia          Morbid obesity ([facility] [address]          Obstructive sleep apnea syndrome          Persistent insomnia          Type 2 diabetes mellitus without complication, without long-term current use of insulin (HCC)  accucheck with sliding scale coverag • High blood pressure    • High cholesterol    • Hyperlipidemia    • Hypogammaglobulinemia (HCC)    • Osteoarthritis    • Palpitations    • Pneumonia     02/20   • Pulmonary embolism ([facility] [address] 2017    r/t picc line also clot right arm   • Seizure disorder ([facility] [address] Management Techniques: Activity promotion; Body mechanics; Relaxation;Repositioning    COGNITION[KS. 1]  · Overall Cognitive Status:  WFL - within functional limits[KS. 2]    RANGE OF MOTION AND STRENGTH ASSESSMENT[KS.1]    Pt ROM of B UE grossly WFL for fxn m Gait Assistance: Not tested           Stoop/Curb Assistance: Not tested  Comment : sat EOB with therapy assist     pt unable to progress to stand with 3 A  present for trial        Patient End of Session: In bed;Needs met;Call light within reach;RN aware o Type of Evaluation: Initial  Presenting Problem: (s/p R MICHELLE; posterior  )    Physician Order: IP Consult to Occupational Therapy  Reason for Therapy: ADL/IADL Dysfunction and Discharge Planning    OCCUPATIONAL THERAPY ASSESSMENT     Patient is a 61 year ol OT Discharge Recommendations: Sub-acute rehabilitation;24 hour care/supervision  OT Device Recommendations: (Hip kit except has reachers )    PLAN  OT Treatment Plan: Balance activities; ADL training;Functional transfer training;UE strengthening/ROM; Cooper Robles • COLONOSCOPY     • EYE SURGERY Left    • HERNIA SURGERY      umbilical   • HIP TOTAL REPLACEMENT Left 1/27/2020    Performed by Madeline Delvalle MD at [address] [place] [address]   • IR IVC FILTER PLACEMENT     • OTHER      IVC filter 1/15/2020   • TOTAL HIP REPLACEMENT Tia [place] with movement of LUE. Pt did not currently have numbness in L hand.      Communication: Makes needs known    Behavioral/Emotional/Social: Pt was cooperative     RANGE OF MOTION   Upper extremity ROM is within functional limits     STRENGTH ASSESSMENT  Upper Patient will complete functional transfer with CGA w/RW and adhere to posterior hip precautions. Comment:     Patient will complete toileting with min. A while adhering to posterior hip precautions.    Comment:     Patient will tolerate standing for 4 m

## (undated) NOTE — IP AVS SNAPSHOT
[facility]            (For Outpatient Use Only) Initial Admit Date: 6/22/2020   Inpt/Obs Admit Date: Inpt: 6/22/20 / Obs: N/A   Discharge Date:    Amanda Wilson:  [de-identified]   MRN: [de-identified]   CSN: [id number]   CEID: [id number]        [facility] Group Number:  Insurance Type:    Subscriber Name:  Subscriber :    Subscriber ID:  Pt Rel to Subscriber:    Hospital Account Financial Class: Medicare    2020

## (undated) NOTE — IP AVS SNAPSHOT
Patient Demographics     Address  [address] [place] [address] Phone  [phone] [facility])  [phone] [facility] E-mail Address  [email or web address]      Emergency Contact(s)     Name Relation Home Work [address] [phone]        All ? If you are short of breath or have calf pain please call us or go to the ER before elevating the leg. 3. How should I take care of my incision and dressing? ? The Aquacel Ag dressing must be removed after 7 days unless saturated before then.  Please r 7. What are the signs of infection I should look for?  ? It is common for the knee and hip to be red and warm after surgery  ? Our suspicion for infection rises with any of the following:  o 1. There is pus like discharge from the incision  o 2.   Your tem 12. How do I get a handicap placard? ? We are happy to provide you with ONE handicap placard at the time of your surgery which will be good for six months. Please ask our office at your pre-op or post-op appointment if you feel you will need a placard. Continue program as long as continuing opioids. Dressing change instructions: The Aquacel dressing is to be removed on post-op day 7, unless the dressing is not adhering properly—in which case the dressing is to be changed sooner.   To remove the dressing, Joshua Oh 12                  Your medication list      TAKE these medications       Instructions Authorizing Provider Morning Afternoon Evening As Needed   B Complex-C-Folic Acid Tabs  Next dose due:  NELSY Take 1,000 mg by mouth 2 (two) times daily with meals. Metoprolol Tartrate 50 MG Tabs  Commonly known as:  LOPRESSOR  Next dose due:  TONIGHT      Take 75 mg by mouth 2 (two) times daily.           Montelukast Sodium 10 MG Tabs  Commonly known as: Zolpidem Tartrate 10 MG Tabs  Commonly known as:  AMBIEN  Next dose due:  AS NEEDED      Take 20 mg by mouth nightly as needed for Sleep.                 Where to Get Your Medications      Please  your prescriptions at the location directed by your d [id number] oxyCODONE HCl ER (OXYCONTIN) 12 hr tab 10 mg 01/29/20 1747 Given      [id number] oxyCODONE HCl ER (OXYCONTIN) 12 hr tab 10 mg 01/30/20 0527 Given      [id number] oxyCODONE-acetaminophen (PERCOCET)  MG per tab 1-2 tablet 01/29/20 1841 Given Components    Component Value Reference Range Flag Lab   PT 15.2 11.8 - 14.5 seconds [facility]   Comment:         Elevations of the PT and/or INR in patients not  receiving anticoagulant therapy may be seen in  factor deficiency, vitamin K deficiency Filed:  1/30/2020  1:43 PM Date of Service:  1/30/2020  1:41 PM Status:  Signed    :  Wing Dmitry PTA (Physical Therapist)       PHYSICAL THERAPY HIP TREATMENT NOTE - INPATIENT    Room Number: 466/821-X            Presenting Problem: L MICHELLE- Static Standing: Fair  Dynamic Standing: Fair -  ACTIVITY TOLERANCE           BP: 121/84  BP Location: Right arm  BP Method: Automatic  Patient Position: Lying    O2 WALK                  AM-PAC '6-Clicks' INPATIENT SHORT FORM - BASIC MOBILITY  How much di assistance level: Supervision   Goal #3   Current Status Pt amb 2  X 40 ft with RW and min a   Goal #4 Patient will negotiate one curb w/ assistive device and CG   Goal #4   Current Status    Goal #5 Patient verbalizes and/or demonstrates all precautions a been calculated based on documentation in the [facility] '6 clicks' Inpatient Basic Mobility Short Form. Research supports that patients with this level of impairment may benefit from sub-acute rehabilitation.     DISCHARGE RECOMMENDATIONS  PT Discharge Recomme Additional Information:     Exercises AM Session PM Session   Ankle Pumps     20 reps 20 reps   Quad Sets 20 reps 20 reps   Glut Sets 20 reps 20 reps                                              Patient End of Session: Up in chair;Call light within re Benign hypertension    Dyslipidemia    Morbid obesity ([facility] [address]    Obstructive sleep apnea syndrome    Persistent insomnia    Supraventricular tachycardia (HCC)    Type 2 diabetes mellitus without complication, without long-term current use of insulin ([address] How much difficulty does the patient currently have. .. [CK.1]  -   Turning over in bed (including adjusting bedclothes, sheets and blankets)?: A Little   -   Sitting down on and standing up from a chair with arms (e.g., wheelchair, bedside commode, etc.): A Goal #5 Patient verbalizes and/or demonstrates all precautions and safety concerns independently   Goal #5   Current Status In progress   Goal #6 Patient independently performs home exercise program for ROM/strengthening per the instructions provided in pr at MA, \"I have a lot of big dogs and cats that I trip over and I have no room to walk\"- pt expressing her roommate is a 'hoarder' and she has no room to safely negotiate a rollator within home.    Bed Mobility: Min A x 1 supine>sit, increased time require PT Discharge Recommendations: Sub-acute rehabilitation;Home with home health PT(ROHAN vs HHPT pending progress- pt wants acute rehab)    PLAN  PT Treatment Plan: Bed mobility; Body mechanics; Endurance; Energy conservation;Patient education;Gait training;Range Lives With: Roommate(works during the day, per pt, Albina More is a hoarder\")  Drives: Yes(has not been driving lately due to pain- roomate assists)  Patient Owned Equipment: Cane(reachers, rollator)  Patient Regularly Uses: None    Prior Level of [place]: -   Moving from lying on back to sitting on the side of the bed?: A Lot   How much help from another person does the patient currently need. ..   -   Moving to and from a bed to a chair (including a wheelchair)?: A Lot   -   Need to walk in hospital room?: ROM/strengthening per the instructions provided in preparation for discharge. Goal #6  Current Status[TL.1]         Attribution Castillo    TL. 1 Erica Honeycutt PT on 1/27/2020  4:59 PM                        Occupational Therapy Notes (last 72 hours) (No Impairment: 50.11% has been calculated based on documentation in the [facility] '6 clicks' Inpatient Daily Activity Short Form.   Research supports that patients with this level of impairment may benefit from ROHAN    DISCHARGE RECOMMENDATIONS     OT Device Recomm Patient will complete self care task at sink level with [place]    Comment: progressing    Patient will independently recall posterior hip precautions  Comment: progressing         Goals  on:2/10/2020  Frequency:3-5x week    Minor Emanuel OTR/L   Occ is her roommate is also a hoarder and alireza be able to provide assist; this would be ongoing issues which was discussed with patient and she would need to address whether it be returning home after this admission or after ROHAN which is not a long term soluti Past Medical History:   Diagnosis Date   • Anesthesia complication     hx of difficult intubation   • Anxiety state    • Arrhythmia    • Asthma     O2 use PRN on days, continuous at HS   • Back problem    • Constipation    • Deep vein blood clot of left lo OCCUPATIONAL THERAPY EXAMINATION     OBJECTIVE  Precautions: MICHELLE - posterior  Fall Risk: High fall risk    WEIGHT BEARING RESTRICTION  Weight Bearing Restriction: L lower extremity           L Lower Extremity: Weight Bearing as Tolerated    PAIN ASSESSMENT Patient will complete self care task at sink level with [place]    Comment:    Patient will independently recall posterior hip precautions  Comment:         Goals  on:2/10/2020  Frequency:3-5x week    Krista Nissen Aper, OTR/L   [facility]

## (undated) NOTE — IP AVS SNAPSHOT
[facility]            (For Outpatient Use Only) Initial Admit Date: 1/27/2020   Inpt/Obs Admit Date: Inpt: 1/27/20 / Obs: N/A   Discharge Date:    Olivia Sherman:  [de-identified]   MRN: [de-identified]   CSN: [id number]   CEID: [id number]        [facility] Subscriber ID:  Pt Rel to Subscriber:    Hospital Account Financial Class: Medicare    January 30, 2020